# Patient Record
Sex: FEMALE | Race: WHITE | Employment: FULL TIME | URBAN - METROPOLITAN AREA
[De-identification: names, ages, dates, MRNs, and addresses within clinical notes are randomized per-mention and may not be internally consistent; named-entity substitution may affect disease eponyms.]

---

## 2017-01-28 DIAGNOSIS — N94.6 SEVERE MENSTRUAL CRAMPS: ICD-10-CM

## 2017-01-30 ENCOUNTER — OFFICE VISIT (OUTPATIENT)
Dept: INTERNAL MEDICINE CLINIC | Age: 42
End: 2017-01-30

## 2017-01-30 VITALS
HEIGHT: 62 IN | RESPIRATION RATE: 16 BRPM | TEMPERATURE: 98.3 F | DIASTOLIC BLOOD PRESSURE: 80 MMHG | WEIGHT: 148.6 LBS | BODY MASS INDEX: 27.34 KG/M2 | SYSTOLIC BLOOD PRESSURE: 100 MMHG | OXYGEN SATURATION: 98 % | HEART RATE: 95 BPM

## 2017-01-30 DIAGNOSIS — J01.10 ACUTE NON-RECURRENT FRONTAL SINUSITIS: Primary | ICD-10-CM

## 2017-01-30 RX ORDER — CYCLOBENZAPRINE HCL 5 MG
TABLET ORAL
Qty: 30 TAB | Refills: 2 | Status: SHIPPED | OUTPATIENT
Start: 2017-01-30 | End: 2017-10-01 | Stop reason: SDUPTHER

## 2017-01-30 RX ORDER — CODEINE PHOSPHATE AND GUAIFENESIN 10; 100 MG/5ML; MG/5ML
10 SOLUTION ORAL
Qty: 200 ML | Refills: 0 | Status: SHIPPED | OUTPATIENT
Start: 2017-01-30 | End: 2017-03-03 | Stop reason: ALTCHOICE

## 2017-01-30 RX ORDER — BUTALBITAL, ASPIRIN, CAFFEINE AND CODEINE PHOSPHATE 50; 325; 40; 30 MG/1; MG/1; MG/1; MG/1
CAPSULE ORAL
Qty: 30 CAP | Refills: 0 | OUTPATIENT
Start: 2017-01-30 | End: 2017-06-17 | Stop reason: SDUPTHER

## 2017-01-30 RX ORDER — AZITHROMYCIN 250 MG/1
250 TABLET, FILM COATED ORAL SEE ADMIN INSTRUCTIONS
Qty: 6 TAB | Refills: 0 | Status: SHIPPED | OUTPATIENT
Start: 2017-01-30 | End: 2017-02-04

## 2017-01-30 NOTE — MR AVS SNAPSHOT
Visit Information Date & Time Provider Department Dept. Phone Encounter #  
 1/30/2017  4:50 PM Erica Oshea, [address] [facility] Internal Medicine [phone] [id number] Follow-up Instructions Return if symptoms worsen or fail to improve. Upcoming Health Maintenance Date Due  
 PAP AKA CERVICAL CYTOLOGY 5/30/2017 COLONOSCOPY 8/31/2021 DTaP/Tdap/Td series (2 - Td) 3/21/2022 Allergies as of 1/30/2017  Review Complete On: 1/30/2017 By: Seng Urbina Severity Noted Reaction Type Reactions Amoxicillin  04/07/2016    Swelling Swelling of hands and feet and rash Bactrim [Sulfamethoxazole-trimethoprim]  02/15/2010    Swelling, Myalgia Doxycycline  07/15/2013    Other (comments)  
 insomnia Suprax [Cefixime]  02/15/2010    Swelling, Myalgia Current Immunizations  Reviewed on 6/28/2016 Name Date TDAP Vaccine 3/21/2012  4:06 PM  
  
 Not reviewed this visit You Were Diagnosed With   
  
 Codes Comments Acute non-recurrent frontal sinusitis    -  Primary ICD-10-CM: J01.10 ICD-9-CM: 122.2 Vitals BP Pulse Temp Resp Height(growth percentile) Weight(growth percentile) 100/80 (BP 1 Location: Right arm, BP Patient Position: Sitting) 95 98.3 °F (36.8 °C) (Oral) 16 5' 2\" (1.575 m) 148 lb 9.6 oz (67.4 kg) LMP SpO2 BMI OB Status Smoking Status 01/22/2017 (Exact Date) 98% 27.18 kg/m2 Having regular periods Never Smoker Vitals History BMI and BSA Data Body Mass Index Body Surface Area  
 27.18 kg/m 2 1.72 m 2 Preferred Pharmacy Pharmacy Name Phone Akilah Chacon [address] [place] [address] Jeu De Paume, [place] [facility] [phone] Your Updated Medication List  
  
   
This list is accurate as of: 1/30/17  5:56 PM.  Always use your most recent med list.  
  
  
  
  
 acetaminophen-codeine 300-30 mg per tablet Commonly known as:  TYLENOL #3  
 TAKE ONE TO TWO TABLETS BY MOUTH EVERY 6 HOURS AS NEEDED FOR SEVERE CRAMPS  
  
 aspirin delayed-release 81 mg tablet Take  by mouth daily. azithromycin 250 mg tablet Commonly known as:  Celina Arcekers Take 1 Tab by mouth See Admin Instructions for 5 days. BUTALBITAL COMPOUND W/CODEINE 45--40 mg per capsule Generic drug:  codeine-butalbital-aspirin-caffeine TAKE ONE CAPSULE BY MOUTH EVERY 6 HOURS AS NEEDED FOR PAIN  
  
 BYSTOLIC 5 mg tablet Generic drug:  nebivolol Take 2.5 mg by mouth daily. clonazePAM 1 mg tablet Commonly known as:  Mae Viking Take 1 mg by mouth three (3) times daily. cyclobenzaprine 5 mg tablet Commonly known as:  FLEXERIL  
TAKE ONE TABLET BY MOUTH THREE TIMES A DAY AS NEEDED FOR MUSCLE SPASM(S) (MENSTRUAL CRAMPS) EFFEXOR  mg capsule Generic drug:  venlafaxine-SR Take 75 mg by mouth every morning. Take 3 tabs daily FINACEA 15 % topical gel Generic drug:  azelaic acid Apply  to affected area two (2) times a day. guaiFENesin-codeine 100-10 mg/5 mL solution Commonly known as:  ROBITUSSIN AC Take 10 mL by mouth three (3) times daily as needed for Cough (night cough). Max Daily Amount: 30 mL.  
  
 hyoscyamine SL 0.125 mg SL tablet Commonly known as:  LEVSIN/SL Take 1 Tab by mouth every six (6) hours as needed for Cramping.  
  
 multivitamin tablet Commonly known as:  ONE A DAY Take 1 Tab by mouth daily. omeprazole 20 mg capsule Commonly known as:  PRILOSEC  
TAKE ONE CAPSULE BY MOUTH TWICE A DAY  
  
 ondansetron 4 mg disintegrating tablet Commonly known as:  ZOFRAN ODT  
DISSOLVE ONE TABLET BY MOUTH EVERY 8 HOURS AS NEEDED FOR NAUSEA  
  
 bciehzphw-uk-dcrfbaha-guaifen 5--200 mg Tab Commonly known as:  VICKS DAYQUIL SEVERE COLD-FLU As directed Prescriptions Printed  Refills  
 guaiFENesin-codeine (ROBITUSSIN AC) 100-10 mg/5 mL solution 0  
 Sig: Take 10 mL by mouth three (3) times daily as needed for Cough (night cough). Max Daily Amount: 30 mL. Class: Print Route: Oral  
  
Prescriptions Sent to Pharmacy Refills  
 azithromycin (ZITHROMAX) 250 mg tablet 0 Sig: Take 1 Tab by mouth See Admin Instructions for 5 days. Class: Normal  
 [facility]: [facility] [address] [place] [address] Jeu De Paume, [place] 2017 [facility] #: [phone] Route: Oral  
  
Follow-up Instructions Return if symptoms worsen or fail to improve. Patient Instructions Sinusitis: Care Instructions Your Care Instructions Sinusitis is an infection of the lining of the sinus cavities in your head. Sinusitis often follows a cold. It causes pain and pressure in your head and face. In most cases, sinusitis gets better on its own in 1 to 2 weeks. But some mild symptoms may last for several weeks. Sometimes antibiotics are needed. Follow-up care is a key part of your treatment and safety. Be sure to make and go to all appointments, and call your doctor if you are having problems. It's also a good idea to know your test results and keep a list of the medicines you take. How can you care for yourself at home? · Take an over-the-counter pain medicine, such as acetaminophen (Tylenol), ibuprofen (Advil, Motrin), or naproxen (Aleve). Read and follow all instructions on the label. · If the doctor prescribed antibiotics, take them as directed. Do not stop taking them just because you feel better. You need to take the full course of antibiotics. · Be careful when taking over-the-counter cold or flu medicines and Tylenol at the same time. Many of these medicines have acetaminophen, which is Tylenol. Read the labels to make sure that you are not taking more than the recommended dose. Too much acetaminophen (Tylenol) can be harmful.  
· Breathe warm, moist air from a steamy shower, a hot bath, or a sink filled with hot water. Avoid cold, dry air. Using a humidifier in your home may help. Follow the directions for cleaning the machine. · Use saline (saltwater) nasal washes to help keep your nasal passages open and wash out mucus and bacteria. You can buy saline nose drops at a grocery store or drugstore. Or you can make your own at home by adding 1 teaspoon of salt and 1 teaspoon of baking soda to 2 cups of distilled water. If you make your own, fill a bulb syringe with the solution, insert the tip into your nostril, and squeeze gently. Stan Mariah your nose. · Put a hot, wet towel or a warm gel pack on your face 3 or 4 times a day for 5 to 10 minutes each time. · Try a decongestant nasal spray like oxymetazoline (Afrin). Do not use it for more than 3 days in a row. Using it for more than 3 days can make your congestion worse. When should you call for help? Call your doctor now or seek immediate medical care if: 
· You have new or worse swelling or redness in your face or around your eyes. · You have a new or higher fever. Watch closely for changes in your health, and be sure to contact your doctor if: 
· You have new or worse facial pain. · The mucus from your nose becomes thicker (like pus) or has new blood in it. · You are not getting better as expected. Where can you learn more? Go to http://thais-ada.info/. Enter J481 in the search box to learn more about \"Sinusitis: Care Instructions. \" Current as of: July 29, 2016 Content Version: 11.1 © 0708-6509 Org[employer]. Care instructions adapted under license by [employer] (which disclaims liability or warranty for this information). If you have questions about a medical condition or this instruction, always ask your healthcare professional. [address] any warranty or liability for your use of this information. Introducing [facility] & HEALTH SERVICES! Dear French Listen: Thank you for requesting a Tolerx account. Our records indicate that you already have an active Tolerx account. You can access your account anytime at [email or web address] [email or web address]/Fwd: Power Did you know that you can access your hospital and ER discharge instructions at any time in Tolerx? You can also review all of your test results from your hospital stay or ER visit. Additional Information If you have questions, please visit the Frequently Asked Questions section of the Tolerx website at [email or web address] [email or web address]/Fwd: [email or web address]/. Remember, Tolerx is NOT to be used for urgent needs. For medical emergencies, dial 911. Now available from your iPhone and Android! Please provide this summary of care documentation to your next provider. Your primary care clinician is listed as SHAY BUI. If you have any questions after today's visit, please call [phone].

## 2017-01-30 NOTE — TELEPHONE ENCOUNTER
Called [facility] pharmacy and gave pharmacist verbal order for refill on butalbital compound w/codeine 40--40mg per capsule- take 1 cap by mouth every 6 hrs as needed for pain. Disp #30 with 0 additional refills.

## 2017-01-30 NOTE — TELEPHONE ENCOUNTER
Cyclobenzaprine last refilled on 6/28/16 and butalbital last refilled on  2/09/16. Are these ok to refill?

## 2017-01-30 NOTE — PATIENT INSTRUCTIONS
Sinusitis: Care Instructions  Your Care Instructions    Sinusitis is an infection of the lining of the sinus cavities in your head. Sinusitis often follows a cold. It causes pain and pressure in your head and face. In most cases, sinusitis gets better on its own in 1 to 2 weeks. But some mild symptoms may last for several weeks. Sometimes antibiotics are needed. Follow-up care is a key part of your treatment and safety. Be sure to make and go to all appointments, and call your doctor if you are having problems. It's also a good idea to know your test results and keep a list of the medicines you take. How can you care for yourself at home? · Take an over-the-counter pain medicine, such as acetaminophen (Tylenol), ibuprofen (Advil, Motrin), or naproxen (Aleve). Read and follow all instructions on the label. · If the doctor prescribed antibiotics, take them as directed. Do not stop taking them just because you feel better. You need to take the full course of antibiotics. · Be careful when taking over-the-counter cold or flu medicines and Tylenol at the same time. Many of these medicines have acetaminophen, which is Tylenol. Read the labels to make sure that you are not taking more than the recommended dose. Too much acetaminophen (Tylenol) can be harmful. · Breathe warm, moist air from a steamy shower, a hot bath, or a sink filled with hot water. Avoid cold, dry air. Using a humidifier in your home may help. Follow the directions for cleaning the machine. · Use saline (saltwater) nasal washes to help keep your nasal passages open and wash out mucus and bacteria. You can buy saline nose drops at a grocery store or drugstore. Or you can make your own at home by adding 1 teaspoon of salt and 1 teaspoon of baking soda to 2 cups of distilled water. If you make your own, fill a bulb syringe with the solution, insert the tip into your nostril, and squeeze gently. Lennox Maizes your nose.   · Put a hot, wet towel or a warm gel pack on your face 3 or 4 times a day for 5 to 10 minutes each time. · Try a decongestant nasal spray like oxymetazoline (Afrin). Do not use it for more than 3 days in a row. Using it for more than 3 days can make your congestion worse. When should you call for help? Call your doctor now or seek immediate medical care if:  · You have new or worse swelling or redness in your face or around your eyes. · You have a new or higher fever. Watch closely for changes in your health, and be sure to contact your doctor if:  · You have new or worse facial pain. · The mucus from your nose becomes thicker (like pus) or has new blood in it. · You are not getting better as expected. Where can you learn more? Go to http://thais-ada.info/. Enter X913 in the search box to learn more about \"Sinusitis: Care Instructions. \"  Current as of: July 29, 2016  Content Version: 11.1  © 4078-0006 [employer]. Care instructions adapted under license by [employer] (which disclaims liability or warranty for this information). If you have questions about a medical condition or this instruction, always ask your healthcare professional. [place] [address] any warranty or liability for your use of this information.

## 2017-01-31 NOTE — PROGRESS NOTES
HISTORY OF PRESENT ILLNESS  Dalia Sexton is a 46 y.o. female. Cold Symptoms   The history is provided by the patient. This is a new problem. Episode onset: 7 d. The problem has not changed since onset. The cough is productive of purulent sputum. There has been no fever. Associated symptoms include headaches and rhinorrhea. Pertinent negatives include no sore throat, no shortness of breath, no wheezing, no nausea and no vomiting. Treatments tried: sinus flush, dayquil at night, mucinex and tylenol. The treatment provided mild relief. She is not a smoker. Her past medical history does not include COPD or asthma. Sinus Pain    The history is provided by the patient. This is a new problem. The current episode started more than 2 days ago. The pain is moderate. Associated symptoms include congestion, hoarse voice, rhinorrhea and headaches. Pertinent negatives include no sore throat and no shortness of breath. Review of Systems   HENT: Positive for congestion, hoarse voice and rhinorrhea. Negative for sore throat. Respiratory: Negative for shortness of breath and wheezing. Gastrointestinal: Negative for nausea and vomiting. Neurological: Positive for headaches. Physical Exam   Constitutional: No distress. HENT:   Right Ear: Tympanic membrane and ear canal normal.   Left Ear: Tympanic membrane and ear canal normal.   Nose: Right sinus exhibits maxillary sinus tenderness and frontal sinus tenderness. Left sinus exhibits no maxillary sinus tenderness and no frontal sinus tenderness. Mouth/Throat: Posterior oropharyngeal edema and posterior oropharyngeal erythema present. No oropharyngeal exudate. Eyes: Conjunctivae are normal. Right eye exhibits no discharge. Left eye exhibits no discharge. Neck: Neck supple. Cardiovascular: Normal rate and regular rhythm. Exam reveals no gallop and no friction rub. No murmur heard.   Pulmonary/Chest: Effort normal and breath sounds normal. Lymphadenopathy:     She has no cervical adenopathy. Nursing note and vitals reviewed. ASSESSMENT and PLAN  Denise Egan was seen today for cold symptoms and sinus pain. Diagnoses and all orders for this visit:    Acute non-recurrent frontal sinusitis  -     guaiFENesin-codeine (ROBITUSSIN AC) 100-10 mg/5 mL solution; Take 10 mL by mouth three (3) times daily as needed for Cough (night cough). Max Daily Amount: 30 mL.  -     azithromycin (ZITHROMAX) 250 mg tablet; Take 1 Tab by mouth See Admin Instructions for 5 days. -     kbswndwoe-og-yantuqob-guaifen (VICKS DAYQUIL SEVERE COLD-FLU) 5--200 mg tab; As directed    This has been fully explained to the patient, who indicates understanding.

## 2017-02-18 RX ORDER — OMEPRAZOLE 20 MG/1
CAPSULE, DELAYED RELEASE ORAL
Qty: 60 CAP | Refills: 9 | Status: SHIPPED | OUTPATIENT
Start: 2017-02-18 | End: 2018-03-13 | Stop reason: SDUPTHER

## 2017-03-03 DIAGNOSIS — N94.6 SEVERE MENSTRUAL CRAMPS: ICD-10-CM

## 2017-03-03 RX ORDER — ACETAMINOPHEN AND CODEINE PHOSPHATE 300; 30 MG/1; MG/1
TABLET ORAL
Qty: 30 TAB | Refills: 3 | OUTPATIENT
Start: 2017-03-03 | End: 2017-09-08 | Stop reason: SDUPTHER

## 2017-03-03 NOTE — TELEPHONE ENCOUNTER
Phoned in prescription below to patients pharmacy on file - verbal order received : Dr Nickolas Tran.

## 2017-03-09 RX ORDER — NEBIVOLOL 5 MG/1
TABLET ORAL
Qty: 45 TAB | Refills: 11 | Status: SHIPPED | OUTPATIENT
Start: 2017-03-09 | End: 2018-03-11 | Stop reason: SDUPTHER

## 2017-05-25 ENCOUNTER — OFFICE VISIT (OUTPATIENT)
Dept: INTERNAL MEDICINE CLINIC | Age: 42
End: 2017-05-25

## 2017-05-25 VITALS
TEMPERATURE: 98.7 F | HEART RATE: 87 BPM | RESPIRATION RATE: 18 BRPM | HEIGHT: 62 IN | DIASTOLIC BLOOD PRESSURE: 80 MMHG | OXYGEN SATURATION: 95 % | WEIGHT: 148.8 LBS | BODY MASS INDEX: 27.38 KG/M2 | SYSTOLIC BLOOD PRESSURE: 116 MMHG

## 2017-05-25 DIAGNOSIS — J02.9 SORE THROAT: Primary | ICD-10-CM

## 2017-05-25 DIAGNOSIS — J06.9 VIRAL URI: ICD-10-CM

## 2017-05-25 LAB
S PYO AG THROAT QL: NEGATIVE
VALID INTERNAL CONTROL?: YES

## 2017-05-25 NOTE — MR AVS SNAPSHOT
Visit Information Date & Time Provider Department Dept. Phone Encounter #  
 5/25/2017 11:30 AM Jarrod Aguilar, 12[address] [facility] Internal Medicine [phone] [id number] Follow-up Instructions Return if symptoms worsen or fail to improve. Upcoming Health Maintenance Date Due  
 PAP AKA CERVICAL CYTOLOGY 5/30/2017 INFLUENZA AGE 9 TO ADULT 8/1/2017 COLONOSCOPY 8/31/2021 DTaP/Tdap/Td series (2 - Td) 3/21/2022 Allergies as of 5/25/2017  Review Complete On: 5/25/2017 By: Jarrod Aguilar MD  
  
 Severity Noted Reaction Type Reactions Amoxicillin  04/07/2016    Swelling Swelling of hands and feet and rash Bactrim [Sulfamethoxazole-trimethoprim]  02/15/2010    Swelling, Myalgia Doxycycline  07/15/2013    Other (comments)  
 insomnia Suprax [Cefixime]  02/15/2010    Swelling, Myalgia Current Immunizations  Reviewed on 6/28/2016 Name Date TDAP Vaccine 3/21/2012  4:06 PM  
  
 Not reviewed this visit You Were Diagnosed With   
  
 Codes Comments Sore throat    -  Primary ICD-10-CM: J02.9 ICD-9-CM: 876 Viral URI     ICD-10-CM: J06.9, B97.89 ICD-9-CM: 465.9 Vitals BP Pulse Temp Resp Height(growth percentile) Weight(growth percentile) 116/80 (BP 1 Location: Right arm, BP Patient Position: Sitting) 87 98.7 °F (37.1 °C) (Oral) 18 5' 2\" (1.575 m) 148 lb 12.8 oz (67.5 kg) LMP SpO2 BMI OB Status Smoking Status 05/09/2017 95% 27.22 kg/m2 Having regular periods Never Smoker Vitals History BMI and BSA Data Body Mass Index Body Surface Area  
 27.22 kg/m 2 1.72 m 2 Preferred Pharmacy Pharmacy Name Phone [facility] [address] [place] [facility] [phone] Your Updated Medication List  
  
   
This list is accurate as of: 5/25/17 12:40 PM.  Always use your most recent med list.  
  
  
  
  
 acetaminophen-codeine 300-30 mg per tablet Commonly known as:  TYLENOL #3  
TAKE ONE TO TWO TABLETS BY MOUTH EVERY 6 HOURS AS NEEDED FOR SEVERE CRAMPS  
  
 aspirin delayed-release 81 mg tablet Take  by mouth daily. BUTALBITAL COMPOUND W/CODEINE 52--40 mg per capsule Generic drug:  codeine-butalbital-aspirin-caffeine TAKE ONE CAPSULE BY MOUTH EVERY 6 HOURS AS NEEDED FOR PAIN  
  
 clonazePAM 1 mg tablet Commonly known as:  Hunter Fabry Take 1 mg by mouth three (3) times daily. cyclobenzaprine 5 mg tablet Commonly known as:  FLEXERIL  
TAKE ONE TABLET BY MOUTH THREE TIMES A DAY AS NEEDED FOR MUSCLE SPASM(S) (MENSTRUAL CRAMPS) EFFEXOR  mg capsule Generic drug:  venlafaxine-SR Take 75 mg by mouth every morning. Take 3 tabs daily FINACEA 15 % topical gel Generic drug:  azelaic acid Apply  to affected area two (2) times a day. hyoscyamine SL 0.125 mg SL tablet Commonly known as:  LEVSIN/SL Take 1 Tab by mouth every six (6) hours as needed for Cramping. MUCINEX PO Take  by mouth. nebivolol 5 mg tablet Commonly known as:  BYSTOLIC Take 1/2 tablet daily. omeprazole 20 mg capsule Commonly known as:  PRILOSEC  
TAKE ONE CAPSULE BY MOUTH TWICE A DAY  
  
 ondansetron 4 mg disintegrating tablet Commonly known as:  ZOFRAN ODT  
DISSOLVE ONE TABLET BY MOUTH EVERY 8 HOURS AS NEEDED FOR NAUSEA  
  
 mfvlrirsq-kd-jylnmuhv-guaifen 5--200 mg Tab Commonly known as:  VICKS DAYQUIL SEVERE COLD-FLU As directed TYLENOL PO Take  by mouth. We Performed the Following AMB POC RAPID STREP A [75743 CPT(R)] CULTURE, STREP THROAT [id number] CPT(R)] Follow-up Instructions Return if symptoms worsen or fail to improve. Introducing hospitals & HEALTH SERVICES! Dear Erlinda Adamson: Thank you for requesting a Research Journalist account. Our records indicate that you already have an active Research Journalist account. You can access your account anytime at [email or web address] Did you know that you can access your hospital and ER discharge instructions at any time in Kizoom? You can also review all of your test results from your hospital stay or ER visit. Additional Information If you have questions, please visit the Frequently Asked Questions section of the Kizoom website at [email or web address] Remember, Kizoom is NOT to be used for urgent needs. For medical emergencies, dial 911. Now available from your iPhone and Android! Please provide this summary of care documentation to your next provider. Your primary care clinician is listed as SHAY BUI. If you have any questions after today's visit, please call [phone].

## 2017-05-25 NOTE — PROGRESS NOTES
HISTORY OF PRESENT ILLNESS  Ange Dewitt is a 46 y.o. female. URI    The history is provided by the patient. This is a new problem. Episode onset: 4 d. The problem has been gradually improving. There has been a fever of 101 - 101.9 F. The fever has been present for 1 - 2 days. Associated symptoms include congestion and sore throat. Pertinent negatives include no diarrhea, no nausea, no vomiting and no cough. Treatments tried: tylenol, mucinex. The treatment provided mild relief. Review of Systems   HENT: Positive for congestion and sore throat. Respiratory: Negative for cough. Gastrointestinal: Negative for diarrhea, nausea and vomiting. Musculoskeletal: Positive for myalgias. Physical Exam   Constitutional: No distress. HENT:   Right Ear: Tympanic membrane and ear canal normal.   Left Ear: Tympanic membrane and ear canal normal.   Nose: Right sinus exhibits frontal sinus tenderness. Right sinus exhibits no maxillary sinus tenderness. Left sinus exhibits no maxillary sinus tenderness and no frontal sinus tenderness. Mouth/Throat: Posterior oropharyngeal edema and posterior oropharyngeal erythema present. No oropharyngeal exudate. Eyes: Conjunctivae are normal. Right eye exhibits no discharge. Left eye exhibits no discharge. Neck: Neck supple. Cardiovascular: Normal rate and regular rhythm. Exam reveals no gallop and no friction rub. No murmur heard. Pulmonary/Chest: Effort normal and breath sounds normal.   Lymphadenopathy:     She has no cervical adenopathy. Nursing note and vitals reviewed. ASSESSMENT and PLAN  Carla Aguero was seen today for sore throat and nasal congestion. Diagnoses and all orders for this visit:    Sore throat  -     CULTURE, STREP THROAT  -     AMB POC RAPID STREP A- neg  -     mlilkqluj-bl-zhmozwbx-guaifen (VICKS DAYQUIL SEVERE COLD-FLU) 5--200 mg tab;  As directed    Viral URI  -     awxnvhume-ql-yhiitiuf-guaifen (VICKS DAYQUIL SEVERE COLD-FLU) 8--200 mg tab;  As directed

## 2017-05-25 NOTE — LETTER
NOTIFICATION RETURN TO WORK / SCHOOL 
 
5/25/2017 12:31 PM 
 
Ms. Veronica Couch [address] [place] [address] [phone] To Whom It May Concern: 
 
Veronica Couch is currently under the care of Anila Palumbo. She will return to work/school on: 5 26 17 If there are questions or concerns please have the patient contact our office. Sincerely, Jarrod Aguilar MD

## 2017-05-27 LAB — B-HEM STREP SPEC QL CULT: NEGATIVE

## 2017-06-19 NOTE — TELEPHONE ENCOUNTER
Last refill was on 1/30/17 with no additional refills. Last ov on 5/25/17, no future ov scheduled at this time. Called pt and informed MD is out of the office till tomorrow she states she has enough medication to last till he returns.  Will fwd msg to MD.

## 2017-06-20 RX ORDER — BUTALBITAL, ASPIRIN, CAFFEINE AND CODEINE PHOSPHATE 50; 325; 40; 30 MG/1; MG/1; MG/1; MG/1
CAPSULE ORAL
Qty: 30 CAP | Refills: 0 | OUTPATIENT
Start: 2017-06-20 | End: 2017-09-18 | Stop reason: SDUPTHER

## 2017-07-25 ENCOUNTER — HOSPITAL ENCOUNTER (EMERGENCY)
Age: 42
Discharge: HOME | End: 2017-07-25
Attending: STUDENT IN AN ORGANIZED HEALTH CARE EDUCATION/TRAINING PROGRAM | Admitting: STUDENT IN AN ORGANIZED HEALTH CARE EDUCATION/TRAINING PROGRAM
Payer: COMMERCIAL

## 2017-07-25 ENCOUNTER — APPOINTMENT (OUTPATIENT)
Dept: ULTRASOUND IMAGING | Age: 42
End: 2017-07-25
Attending: STUDENT IN AN ORGANIZED HEALTH CARE EDUCATION/TRAINING PROGRAM
Payer: COMMERCIAL

## 2017-07-25 VITALS
DIASTOLIC BLOOD PRESSURE: 70 MMHG | BODY MASS INDEX: 27.46 KG/M2 | HEIGHT: 63 IN | TEMPERATURE: 98.1 F | RESPIRATION RATE: 16 BRPM | WEIGHT: 154.98 LBS | HEART RATE: 69 BPM | SYSTOLIC BLOOD PRESSURE: 118 MMHG | OXYGEN SATURATION: 98 %

## 2017-07-25 DIAGNOSIS — M79.662 PAIN OF LEFT CALF: Primary | ICD-10-CM

## 2017-07-25 PROCEDURE — 93971 EXTREMITY STUDY: CPT

## 2017-07-25 PROCEDURE — 99282 EMERGENCY DEPT VISIT SF MDM: CPT

## 2017-07-25 NOTE — ED NOTES
Plan of care and all test ordered explained to pt. Good understanding and agreement with plan was verbalized.

## 2017-07-25 NOTE — ED NOTES
Purposeful rounding done. Pt sitting up on stretcher. Offered assist with any needs. Call bell in reach will continue to monitor.

## 2017-07-25 NOTE — DISCHARGE INSTRUCTIONS
We hope that we have addressed all of your medical concerns. The examination and treatment you received in the Emergency Department were for an emergent problem and were not intended as complete care. It is important that you follow up with your healthcare provider(s) for ongoing care. If your symptoms worsen or do not improve as expected, and you are unable to reach your usual health care provider(s), you should return to the Emergency Department. Today's healthcare is undergoing tremendous change, and patient satisfaction surveys are one of the many tools to assess the quality of medical care. You may receive a survey from the [employer] organization regarding your experience in the Emergency Department. I hope that your experience has been completely positive, particularly the medical care that I provided. As such, please participate in the survey; anything less than excellent does not meet my expectations or intentions. [facility] and 8 [facility] participate in nationally recognized quality of care measures. If your blood pressure is greater than 120/80, as reported below, we urge that you seek medical care to address the potential of high blood pressure, commonly known as hypertension. Hypertension can be hereditary or can be caused by certain medical conditions, pain, stress, or \"white coat syndrome. \"       Please make an appointment with your health care provider(s) for follow up of your Emergency Department visit. VITALS:   Patient Vitals for the past 8 hrs:   Temp Pulse Resp BP SpO2   07/25/17 0700 98.1 °F (36.7 °C) 83 18 136/88 97 %          Thank you for allowing us to provide you with medical care today. We realize that you have many choices for your emergency care needs. Please choose us in the future for any continued health care needs. Aileen Perez, [address] [place] [address].   Office: [phone]            No results found for this or any previous visit (from the past 24 hour(s)). Duplex Lower Ext Venous Left    Result Date: 7/25/2017  INDICATION:  concern for DVT left lower ext. Pain left leg COMPARISON: None. FINDINGS: Duplex Doppler sonography of the left lower extremity was performed from the groin to the calf. The left common femoral, femoral and popliteal veins are compressible with normal color-flow and wave forms and response to physiologic maneuvers including Valsalva and augmentation. IMPRESSION:  No deep venous thrombosis identified. Shin Splints (Shin Pain): Exercises  Your Care Instructions  Here are some examples of typical rehabilitation exercises for your condition. Start each exercise slowly. Ease off the exercise if you start to have pain. Your doctor or physical therapist will tell you when you can start these exercises and which ones will work best for you. How to do the exercises  Calf wall stretch (back knee straight)    1. Stand facing a wall with your hands on the wall at about eye level. Put your affected leg about a step behind your other leg. 2. Keeping your back leg straight and your back heel on the floor, bend your front knee and gently bring your hip and chest toward the wall until you feel a stretch in the calf of your back leg. 3. Hold the stretch for at least 15 to 30 seconds. 4. Repeat 2 to 4 times. Calf wall stretch (knees bent)    1. Stand facing a wall with your hands on the wall at about eye level. Put your affected leg about a step behind your other leg. 2. Keeping both heels on the floor, bend both knees. Then gently bring your hip and chest toward the wall until you feel a stretch in the calf of your back leg. 3. Hold the stretch for at least 15 to 30 seconds. 4. Repeat 2 to 4 times. Hamstring wall stretch    1. Lie on your back in a doorway, with your good leg through the open door.   2. Slide your affected leg up the wall to straighten your knee. You should feel a gentle stretch down the back of your leg. ¨ Do not arch your back. ¨ Do not bend either knee. ¨ Keep one heel touching the floor and the other heel touching the wall. Do not point your toes. 3. Hold the stretch for at least 1 minute to begin. Then over time, try to lengthen the time you hold the stretch to as long as 6 minutes. 4. Repeat 2 to 4 times. If you do not have a place to do this exercise in a doorway, there is another way to do it:  1. Lie on your back, and bend the knee of your affected leg. 2. Loop a towel under the ball and toes of that foot, and hold the ends of the towel in your hands. 3. Straighten your knee, and slowly pull back on the towel. You should feel a gentle stretch down the back of your leg. 4. Hold the stretch for 15 to 30 seconds. Or even better, hold the stretch for 1 minute if you can. 5. Repeat 2 to 4 times. Shin muscle stretch    1. Sit in a chair, with both feet flat on the floor. 2. Bend your affected leg behind you so that the top of your foot near your toes is flat on the floor and your toes are pointed away from your body. If you need to, you can hold on to the sides of the chair for support. 3. Hold the stretch for at least 15 to 30 seconds. You should feel a stretch in the front (shin) of your lower leg. 4. Repeat 2 to 4 times. Follow-up care is a key part of your treatment and safety. Be sure to make and go to all appointments, and call your doctor if you are having problems. It's also a good idea to know your test results and keep a list of the medicines you take. Where can you learn more? Go to http://thais-ada.info/. Enter U446 in the search box to learn more about \"Shin Splints (Shin Pain): Exercises. \"  Current as of: March 21, 2017  Content Version: 11.3  © 1921-9857 [employer].  Care instructions adapted under license by [employer] (which disclaims liability or warranty for this information). If you have questions about a medical condition or this instruction, always ask your healthcare professional. [place] [address] any warranty or liability for your use of this information.

## 2017-07-25 NOTE — ED PROVIDER NOTES
HPI Comments: Ms. Edgar Robertson is a 68-year-old female presenting to the emergency department with left calf pain. Patient states that the pain started approximately one week ago and progressively become worse patient is concerned for a DVT as she has a past medical history significant for factor V Leiden deficiency. Patient denies any other associated symptoms including shortness of breath chest pain dyspnea on exertion. Patient says that she has recently started working out again including cardiac activity running in place and is coming in to the ED for ultrasound for reassurance. Patient is a 38 y.o. female presenting with leg pain. The history is provided by the patient. Leg Pain    Pertinent negatives include no numbness and no back pain. Past Medical History:   Diagnosis Date    Dysmenorrhea     Factor V Leiden mutation ([address]     Headache     migraines    Hypercholesterolemia     Panic attacks     PVC's        Past Surgical History:   Procedure Laterality Date    HX HEENT      lasik    HX UROLOGICAL      dilation age 7         Family History:   Problem Relation Age of Onset    Other Sister      protein C defic., DVT    Cancer Maternal Grandmother      breast    Cancer Paternal Grandmother      ? Social History     Social History    Marital status: SINGLE     Spouse name: N/A    Number of children: N/A    Years of education: N/A     Occupational History    Not on file. Social History Main Topics    Smoking status: Never Smoker    Smokeless tobacco: Never Used    Alcohol use Yes      Comment: rarely    Drug use: Not on file    Sexual activity: Not on file     Other Topics Concern    Not on file     Social History Narrative         ALLERGIES: Amoxicillin; Bactrim [sulfamethoxazole-trimethoprim]; Doxycycline; and Suprax [cefixime]    Review of Systems   Constitutional: Negative for activity change, diaphoresis, fatigue and fever. HENT: Negative for congestion and sore throat. Eyes: Negative for photophobia and visual disturbance. Respiratory: Negative for chest tightness and shortness of breath. Cardiovascular: Negative for chest pain, palpitations and leg swelling. Gastrointestinal: Negative for abdominal pain, blood in stool, constipation, diarrhea, nausea and vomiting. Genitourinary: Negative for difficulty urinating, dysuria, flank pain, frequency and hematuria. Musculoskeletal: Negative for back pain. Neurological: Negative for dizziness, syncope, numbness and headaches. Vitals:    07/25/17 0700 07/25/17 0820   BP: 136/88 118/70   Pulse: 83 69   Resp: 18 16   Temp: 98.1 °F (36.7 °C)    SpO2: 97% 98%   Weight: 70.3 kg (154 lb 15.7 oz)    Height: 5' 3\" (1.6 m)             Physical Exam   Constitutional: She is oriented to person, place, and time. She appears well-developed and well-nourished. No distress. HENT:   Head: Normocephalic and atraumatic. Nose: Nose normal.   Mouth/Throat: Oropharynx is clear and moist. No oropharyngeal exudate. Eyes: Conjunctivae and EOM are normal. Right eye exhibits no discharge. Left eye exhibits no discharge. No scleral icterus. Neck: Normal range of motion. Neck supple. No JVD present. No tracheal deviation present. No thyromegaly present. Cardiovascular: Normal rate, regular rhythm, normal heart sounds and intact distal pulses. Exam reveals no gallop and no friction rub. No murmur heard. Pulmonary/Chest: Effort normal and breath sounds normal. No stridor. No respiratory distress. She has no wheezes. She has no rales. She exhibits no tenderness. Abdominal: Bowel sounds are normal. She exhibits no distension and no mass. There is no tenderness. There is no rebound. Musculoskeletal: Normal range of motion. She exhibits no edema or tenderness. Lymphadenopathy:     She has no cervical adenopathy. Neurological: She is alert and oriented to person, place, and time. No cranial nerve deficit.  Coordination normal. Skin: Skin is warm and dry. No rash noted. She is not diaphoretic. No erythema. No pallor. Psychiatric: She has a normal mood and affect. Her behavior is normal. Judgment and thought content normal.        MDM  Number of Diagnoses or Management Options  Pain of left calf:      Amount and/or Complexity of Data Reviewed  Tests in the radiology section of CPT®: ordered and reviewed  Review and summarize past medical records: yes    Risk of Complications, Morbidity, and/or Mortality  Presenting problems: moderate  Diagnostic procedures: moderate  Management options: moderate    Patient Progress  Patient progress: stable    ED Course       Procedures      6:29 PM  The patient has been reevaluated. The patient is ready for discharge. The patient's signs, symptoms, diagnosis, and discharge instructions have been discussed and the patient/ family has conveyed their understanding. The patient is to follow up as recommended or return to the ED should their symptoms worsen. Plan has been discussed and the patient is in agreement. LABORATORY TESTS:  No results found for this or any previous visit (from the past 12 hour(s)). IMAGING RESULTS:  DUPLEX LOWER EXT VENOUS LEFT   Final Result        Duplex Lower Ext Venous Left    Result Date: 7/25/2017  INDICATION:  concern for DVT left lower ext. Pain left leg COMPARISON: None. FINDINGS: Duplex Doppler sonography of the left lower extremity was performed from the groin to the calf. The left common femoral, femoral and popliteal veins are compressible with normal color-flow and wave forms and response to physiologic maneuvers including Valsalva and augmentation. IMPRESSION:  No deep venous thrombosis identified. MEDICATIONS GIVEN:  Medications - No data to display    IMPRESSION:  1. Pain of left calf        PLAN:  1. Discharge Medication List as of 7/25/2017  8:11 AM        2.    Follow-up Information     Follow up With Details Comments [facility] Jen Person MD  If symptoms worsen [address]   [address] [facility]  [phone]      [facility]  If symptoms worsen [place] [address]  [phone]            Return to ED for new or worsening symptoms       Belinda Gee MD

## 2017-07-25 NOTE — ED NOTES
Pt was discharged and given instructions by Dr Nigel Fuentes . Pt verbalized good understanding of all discharge instructions and F/U care. All questions answered. Pt in stable condition on discharge.

## 2017-08-31 ENCOUNTER — OFFICE VISIT (OUTPATIENT)
Dept: INTERNAL MEDICINE CLINIC | Age: 42
End: 2017-08-31

## 2017-08-31 VITALS
RESPIRATION RATE: 16 BRPM | BODY MASS INDEX: 27.5 KG/M2 | OXYGEN SATURATION: 94 % | WEIGHT: 155.2 LBS | HEART RATE: 90 BPM | DIASTOLIC BLOOD PRESSURE: 80 MMHG | TEMPERATURE: 99.2 F | SYSTOLIC BLOOD PRESSURE: 110 MMHG | HEIGHT: 63 IN

## 2017-08-31 DIAGNOSIS — L85.8 KERATOSIS PILARIS: Primary | ICD-10-CM

## 2017-08-31 NOTE — MR AVS SNAPSHOT
Visit Information Date & Time Provider Department Dept. Phone Encounter #  
 8/31/2017  4:50 PM Rachid Castellano, [address] [phone] Follow-up Instructions Return if symptoms worsen or fail to improve. Upcoming Health Maintenance Date Due INFLUENZA AGE 9 TO ADULT 8/1/2017 PAP AKA CERVICAL CYTOLOGY 6/1/2018 COLONOSCOPY 8/31/2021 DTaP/Tdap/Td series (2 - Td) 3/21/2022 Allergies as of 8/31/2017  Review Complete On: 8/31/2017 By: Rachid Castellano MD  
  
 Severity Noted Reaction Type Reactions Amoxicillin  04/07/2016    Swelling Swelling of hands and feet and rash Bactrim [Sulfamethoxazole-trimethoprim]  02/15/2010    Swelling, Myalgia Doxycycline  07/15/2013    Other (comments)  
 insomnia Suprax [Cefixime]  02/15/2010    Swelling, Myalgia Current Immunizations  Reviewed on 6/28/2016 Name Date TDAP Vaccine 3/21/2012  4:06 PM  
  
 Not reviewed this visit You Were Diagnosed With   
  
 Codes Comments Keratosis pilaris    -  Primary ICD-10-CM: L85.8 ICD-9-CM: 757.39 Vitals BP Pulse Temp Resp Height(growth percentile) Weight(growth percentile) 110/80 (BP 1 Location: Right arm, BP Patient Position: Sitting) 90 99.2 °F (37.3 °C) (Oral) 16 5' 3\" (1.6 m) 155 lb 3.2 oz (70.4 kg) LMP SpO2 BMI OB Status Smoking Status 08/27/2017 (Exact Date) 94% 27.49 kg/m2 Having regular periods Never Smoker BMI and BSA Data Body Mass Index Body Surface Area  
 27.49 kg/m 2 1.77 m 2 Preferred Pharmacy Pharmacy Name Phone [facility], [place] [address] [facility] [phone] Your Updated Medication List  
  
   
This list is accurate as of: 8/31/17  5:46 PM.  Always use your most recent med list.  
  
  
  
  
 acetaminophen-codeine 300-30 mg per tablet Commonly known as:  TYLENOL #3  
 TAKE ONE TO TWO TABLETS BY MOUTH EVERY 6 HOURS AS NEEDED FOR SEVERE CRAMPS  
  
 aspirin delayed-release 81 mg tablet Take  by mouth daily. BUTALBITAL COMPOUND W/CODEINE 22--40 mg per capsule Generic drug:  codeine-butalbital-aspirin-caffeine TAKE ONE CAPSULE BY MOUTH EVERY 6 HOURS AS NEEDED FOR PAIN  
  
 clonazePAM 1 mg tablet Commonly known as:  Waynard Huitron Take 1 mg by mouth three (3) times daily. cyclobenzaprine 5 mg tablet Commonly known as:  FLEXERIL  
TAKE ONE TABLET BY MOUTH THREE TIMES A DAY AS NEEDED FOR MUSCLE SPASM(S) (MENSTRUAL CRAMPS) EFFEXOR  mg capsule Generic drug:  venlafaxine-SR Take 75 mg by mouth every morning. Take 3 tabs daily FINACEA 15 % topical gel Generic drug:  azelaic acid Apply  to affected area two (2) times a day. hyoscyamine SL 0.125 mg SL tablet Commonly known as:  LEVSIN/SL  
DISSOLVE ONE TABLET UNDER THE TONGUE EVERY 6 HOURS AS NEEDED FOR CRAMPING  
  
 nebivolol 5 mg tablet Commonly known as:  BYSTOLIC Take 1/2 tablet daily. omeprazole 20 mg capsule Commonly known as:  PRILOSEC  
TAKE ONE CAPSULE BY MOUTH TWICE A DAY  
  
 ondansetron 4 mg disintegrating tablet Commonly known as:  ZOFRAN ODT  
DISSOLVE ONE TABLET BY MOUTH EVERY 8 HOURS AS NEEDED FOR NAUSEA We Performed the Following REFERRAL TO DERMATOLOGY [REF19 Custom] Follow-up Instructions Return if symptoms worsen or fail to improve. Referral Information Referral ID Referred By Referred To  
  
 [id number] Carolina BUI MD   
   [id number] [facility], [address] Phone: [phone] Fax: [phone] Visits Status Start Date End Date 1 New Request 8/31/17 8/31/18 If your referral has a status of pending review or denied, additional information will be sent to support the outcome of this decision. Patient Instructions Keratosis Pilaris in Children: Care Instructions Your Care Instructions Keratosis pilaris is a skin problem. It hardens the skin around pores or hair follicles. A hair follicle is the place where a hair begins to grow. Your child may have small, red bumps on his or her arms or thighs. You might notice them more in winter than summer. The bumps may come and go. Often, they go away as a child gets older. In some cases, this skin problem is passed down from family members. It is more common in children who have asthma, hay fever, eczema, or other skin problems. This problem is not an infection, and it is not contagious. Your child can't spread it to others. It also won't hurt your child and it usually doesn't itch. But if your child feels embarrassed, cleansers and lotions may help it look better. Follow-up care is a key part of your child's treatment and safety. Be sure to make and go to all appointments, and call your doctor if your child is having problems. It's also a good idea to know your child's test results and keep a list of the medicines your child takes. How can you care for your child at home? · Use a gentle soap or cleanser that won't dry your child's skin. Good choices are Aveeno, Dove, and Neutrogena. · Put a mild, over-the-counter lotion on your child's skin. Do this several times a day. · Try different cleansers, soaps, and lotions to find ones that work for your child. · If the ones you try don't work, ask your doctor for suggestions. Some doctors suggest lotions with lactic acid. Two examples are Lac-Hydrin or LactiCare. · If your child's doctor prescribes a cream, use it as directed. If the doctor prescribes medicine, give it exactly as directed. When should you call for help? Call your doctor now or seek immediate medical care if: 
· Your child has symptoms of infection, such as: 
¨ Increased pain, swelling, warmth, or redness. ¨ Red streaks leading from the area. ¨ Pus draining from the area. ¨ A fever. Watch closely for changes in your child's health, and be sure to contact your doctor if: 
· Your child does not get better as expected. Where can you learn more? Go to http://thais-ada.info/. Enter Q531 in the search box to learn more about \"Keratosis Pilaris in Children: Care Instructions. \" Current as of: October 13, 2016 Content Version: 11.3 © 5965-2508 [employer]. Care instructions adapted under license by [employer] (which disclaims liability or warranty for this information). If you have questions about a medical condition or this instruction, always ask your healthcare professional. [address] any warranty or liability for your use of this information. Introducing [facility] & HEALTH SERVICES! Dear Maritza Rosario: Thank you for requesting a [employer] account. Our records indicate that you already have an active [employer] account. You can access your account anytime at [email or web address] Did you know that you can access your hospital and ER discharge instructions at any time in [employer]? You can also review all of your test results from your hospital stay or ER visit. Additional Information If you have questions, please visit the Frequently Asked Questions section of the [employer] website at [email or web address] Remember, [employer] is NOT to be used for urgent needs. For medical emergencies, dial 911. Now available from your iPhone and Android! Please provide this summary of care documentation to your next provider. Your primary care clinician is listed as SHAY BUI. If you have any questions after today's visit, please call [phone].

## 2017-08-31 NOTE — PATIENT INSTRUCTIONS
Keratosis Pilaris in Children: Care Instructions  Your Care Instructions  Keratosis pilaris is a skin problem. It hardens the skin around pores or hair follicles. A hair follicle is the place where a hair begins to grow. Your child may have small, red bumps on his or her arms or thighs. You might notice them more in winter than summer. The bumps may come and go. Often, they go away as a child gets older. In some cases, this skin problem is passed down from family members. It is more common in children who have asthma, hay fever, eczema, or other skin problems. This problem is not an infection, and it is not contagious. Your child can't spread it to others. It also won't hurt your child and it usually doesn't itch. But if your child feels embarrassed, cleansers and lotions may help it look better. Follow-up care is a key part of your child's treatment and safety. Be sure to make and go to all appointments, and call your doctor if your child is having problems. It's also a good idea to know your child's test results and keep a list of the medicines your child takes. How can you care for your child at home? · Use a gentle soap or cleanser that won't dry your child's skin. Good choices are Aveeno, Dove, and Neutrogena. · Put a mild, over-the-counter lotion on your child's skin. Do this several times a day. · Try different cleansers, soaps, and lotions to find ones that work for your child. · If the ones you try don't work, ask your doctor for suggestions. Some doctors suggest lotions with lactic acid. Two examples are Lac-Hydrin or LactiCare. · If your child's doctor prescribes a cream, use it as directed. If the doctor prescribes medicine, give it exactly as directed. When should you call for help? Call your doctor now or seek immediate medical care if:  · Your child has symptoms of infection, such as:  ¨ Increased pain, swelling, warmth, or redness. ¨ Red streaks leading from the area.   ¨ Pus draining from the area. ¨ A fever. Watch closely for changes in your child's health, and be sure to contact your doctor if:  · Your child does not get better as expected. Where can you learn more? Go to http://thais-ada.info/. Enter F672 in the search box to learn more about \"Keratosis Pilaris in Children: Care Instructions. \"  Current as of: October 13, 2016  Content Version: 11.3  © 5542-5518 [employer]. Care instructions adapted under license by [employer] (which disclaims liability or warranty for this information). If you have questions about a medical condition or this instruction, always ask your healthcare professional. [address] any warranty or liability for your use of this information.

## 2017-09-08 DIAGNOSIS — N94.6 SEVERE MENSTRUAL CRAMPS: ICD-10-CM

## 2017-09-08 RX ORDER — ACETAMINOPHEN AND CODEINE PHOSPHATE 300; 30 MG/1; MG/1
TABLET ORAL
Qty: 30 TAB | Refills: 3 | OUTPATIENT
Start: 2017-09-08 | End: 2018-04-21 | Stop reason: SDUPTHER

## 2017-09-08 NOTE — TELEPHONE ENCOUNTER
Phoned in prescription below to patients pharmacy on file - verbal order received : Dr María Elena Snyder.

## 2017-09-11 ENCOUNTER — TELEPHONE (OUTPATIENT)
Dept: INTERNAL MEDICINE CLINIC | Age: 42
End: 2017-09-11

## 2017-09-11 NOTE — TELEPHONE ENCOUNTER
Spoke with Eliezer Garcia pharmacist at [facility] - advised her I called Rx in on 9/8/17. She states for some reason did not have quantity or refills - advised #30 with 3 refills.

## 2017-09-11 NOTE — TELEPHONE ENCOUNTER
[facility] [address], [place] [address] Mas[facility] Stiles (Pharmacy) [phone]     Calling to verify quanity of rx for tylenol 3 rx.

## 2017-09-15 RX ORDER — ONDANSETRON 4 MG/1
TABLET, ORALLY DISINTEGRATING ORAL
Qty: 30 TAB | Refills: 2 | Status: SHIPPED | OUTPATIENT
Start: 2017-09-15 | End: 2018-06-23 | Stop reason: SDUPTHER

## 2017-09-18 RX ORDER — BUTALBITAL, ASPIRIN, CAFFEINE AND CODEINE PHOSPHATE 50; 325; 40; 30 MG/1; MG/1; MG/1; MG/1
CAPSULE ORAL
Qty: 30 CAP | Refills: 0 | OUTPATIENT
Start: 2017-09-18 | End: 2018-01-21 | Stop reason: SDUPTHER

## 2017-09-30 DIAGNOSIS — N94.6 SEVERE MENSTRUAL CRAMPS: ICD-10-CM

## 2017-10-01 RX ORDER — CYCLOBENZAPRINE HCL 5 MG
TABLET ORAL
Qty: 30 TAB | Refills: 1 | Status: SHIPPED | COMMUNITY
Start: 2017-10-01 | End: 2018-01-01 | Stop reason: SDUPTHER

## 2018-01-01 DIAGNOSIS — N94.6 SEVERE MENSTRUAL CRAMPS: ICD-10-CM

## 2018-01-02 RX ORDER — CYCLOBENZAPRINE HCL 5 MG
TABLET ORAL
Qty: 30 TAB | Refills: 0 | Status: SHIPPED | OUTPATIENT
Start: 2018-01-02 | End: 2018-03-13 | Stop reason: SDUPTHER

## 2018-01-21 DIAGNOSIS — G43.709 CHRONIC MIGRAINE WITHOUT AURA WITHOUT STATUS MIGRAINOSUS, NOT INTRACTABLE: Primary | ICD-10-CM

## 2018-01-22 RX ORDER — BUTALBITAL, ASPIRIN, CAFFEINE AND CODEINE PHOSPHATE 50; 325; 40; 30 MG/1; MG/1; MG/1; MG/1
CAPSULE ORAL
Qty: 30 CAP | Refills: 0 | OUTPATIENT
Start: 2018-01-22 | End: 2018-06-04

## 2018-01-22 NOTE — TELEPHONE ENCOUNTER
[employer] and spoke to the pharmacist- Viridiana Romero and gave the verbal order for refill on Butalbital compound W/codeine 07--40 mg per capsule-take 1 capsule by mouth every 6 hrs as needed for pain. Dispense #30, refills-0.

## 2018-03-11 RX ORDER — NEBIVOLOL HYDROCHLORIDE 5 MG/1
TABLET ORAL
Qty: 45 TAB | Refills: 3 | Status: SHIPPED | OUTPATIENT
Start: 2018-03-11 | End: 2019-03-11 | Stop reason: SDUPTHER

## 2018-03-13 DIAGNOSIS — N94.6 SEVERE MENSTRUAL CRAMPS: ICD-10-CM

## 2018-03-14 RX ORDER — OMEPRAZOLE 20 MG/1
CAPSULE, DELAYED RELEASE ORAL
Qty: 60 CAP | Refills: 8 | Status: SHIPPED | OUTPATIENT
Start: 2018-03-14 | End: 2018-03-15 | Stop reason: SDUPTHER

## 2018-03-14 RX ORDER — CYCLOBENZAPRINE HCL 5 MG
TABLET ORAL
Qty: 30 TAB | Refills: 0 | Status: SHIPPED | OUTPATIENT
Start: 2018-03-14 | End: 2018-05-04 | Stop reason: SDUPTHER

## 2018-03-15 ENCOUNTER — OFFICE VISIT (OUTPATIENT)
Dept: INTERNAL MEDICINE CLINIC | Age: 43
End: 2018-03-15

## 2018-03-15 VITALS
WEIGHT: 150.8 LBS | TEMPERATURE: 98.5 F | OXYGEN SATURATION: 97 % | RESPIRATION RATE: 20 BRPM | HEART RATE: 83 BPM | HEIGHT: 63 IN | BODY MASS INDEX: 26.72 KG/M2 | DIASTOLIC BLOOD PRESSURE: 74 MMHG | SYSTOLIC BLOOD PRESSURE: 110 MMHG

## 2018-03-15 DIAGNOSIS — R10.10 UPPER ABDOMINAL PAIN: Primary | ICD-10-CM

## 2018-03-15 DIAGNOSIS — R10.9 ABDOMINAL PAIN, UNSPECIFIED ABDOMINAL LOCATION: ICD-10-CM

## 2018-03-15 LAB
BILIRUB UR QL STRIP: NEGATIVE
GLUCOSE UR-MCNC: NEGATIVE MG/DL
KETONES P FAST UR STRIP-MCNC: NEGATIVE MG/DL
PH UR STRIP: 5.5 [PH] (ref 4.6–8)
PROT UR QL STRIP: NEGATIVE
SP GR UR STRIP: 1.02 (ref 1–1.03)
UA UROBILINOGEN AMB POC: NORMAL (ref 0.2–1)
URINALYSIS CLARITY POC: CLEAR
URINALYSIS COLOR POC: YELLOW
URINE BLOOD POC: NORMAL
URINE LEUKOCYTES POC: NEGATIVE
URINE NITRITES POC: NEGATIVE

## 2018-03-15 RX ORDER — OMEPRAZOLE 20 MG/1
CAPSULE, DELAYED RELEASE ORAL
Qty: 1 CAP | Refills: 0
Start: 2018-03-15 | End: 2019-05-01 | Stop reason: SDUPTHER

## 2018-03-15 RX ORDER — ECONAZOLE NITRATE 10 MG/G
CREAM TOPICAL 2 TIMES DAILY
Refills: 7 | COMMUNITY
Start: 2017-12-21 | End: 2018-12-24 | Stop reason: ALTCHOICE

## 2018-03-15 NOTE — PROGRESS NOTES
HISTORY OF PRESENT ILLNESS  Laci Pro is a 38 y.o. female. Nausea    The history is provided by the patient. This is a new problem. Episode onset: 3 d. Episode frequency: no vomiting. The problem has been rapidly improving. There has been no fever. Associated symptoms include abdominal pain. Pertinent negatives include no chills, no fever, no diarrhea and no URI. Risk factors: none. Her pertinent negatives include no irritable bowel syndrome and no recent abdominal surgery. Abdominal Pain   The history is provided by the patient (bilateral upper abdomen, now minimal in the luq, 1/10 now , was 6/10 at worst). This is a new problem. Episode onset: 3 d. Associated symptoms include abdominal pain. Review of Systems   Constitutional: Negative for chills and fever. Gastrointestinal: Positive for abdominal pain and nausea. Negative for diarrhea. Physical Exam   Constitutional: No distress. Cardiovascular: Normal rate and regular rhythm. Exam reveals no gallop and no friction rub. No murmur heard. Pulmonary/Chest: Effort normal and breath sounds normal.   Abdominal: Soft. She exhibits no distension. There is no tenderness. There is no rigidity, no rebound and no guarding. Nursing note and vitals reviewed. ASSESSMENT and PLAN  Diagnoses and all orders for this visit:    1. Upper abdominal pain  -     omeprazole (PRILOSEC) 20 mg capsule; TAKE ONE CAPSULE BY MOUTH TWICE A DAY- x 7 days. If recurs, consider abdominal US. If refractory, consider See gastroenterologist as directed     2.  Abdominal pain, unspecified abdominal location  -     AMB POC URINALYSIS DIP STICK AUTO W/ MICRO- neg

## 2018-03-15 NOTE — MR AVS SNAPSHOT
[address] 
[phone] Patient: Jessy Palacio MRN:  [id number] Visit Information Date & Time Provider Department Dept. Phone Encounter #  
 3/15/2018  2:40 PM Jed Fleming, [facility] [phone] [id number] Upcoming Health Maintenance Date Due  
 PAP AKA CERVICAL CYTOLOGY 2018 COLONOSCOPY 2021 DTaP/Tdap/Td series (2 - Td) 3/21/2022 Allergies as of 3/15/2018  Review Complete On: 3/15/2018 By: Jed Fleming MD  
  
 Severity Noted Reaction Type Reactions Amoxicillin  2016    Swelling Swelling of hands and feet and rash Bactrim [Sulfamethoxazole-trimethoprim]  02/15/2010    Swelling, Myalgia Doxycycline  07/15/2013    Other (comments)  
 insomnia Suprax [Cefixime]  02/15/2010    Swelling, Myalgia Current Immunizations  Reviewed on 2016 Name Date TDAP Vaccine 3/21/2012  4:06 PM  
  
 Not reviewed this visit You Were Diagnosed With   
  
 Codes Comments Upper abdominal pain    -  Primary ICD-10-CM: R10.10 ICD-9-CM: 789.09 Abdominal pain, unspecified abdominal location     ICD-10-CM: R10.9 ICD-9-CM: 789.00 Vitals BP Pulse Temp Resp Height(growth percentile) Weight(growth percentile) 110/74 (BP 1 Location: Right arm, BP Patient Position: Sitting) 83 98.5 °F (36.9 °C) (Oral) 20 5' 3\" (1.6 m) 150 lb 12.8 oz (68.4 kg) LMP SpO2 BMI OB Status Smoking Status 2018 (Exact Date) 97% 26.71 kg/m2 Having regular periods Never Smoker BMI and BSA Data Body Mass Index Body Surface Area  
 26.71 kg/m 2 1.74 m 2 Preferred Pharmacy Pharmacy Name Phone [facility] [address] [facility] [phone] Your Updated Medication List  
  
   
This list is accurate as of 3/15/18  3:24 PM.  Always use your most recent med list.  
  
  
  
 acetaminophen-codeine 300-30 mg per tablet Commonly known as:  TYLENOL #3  
TAKE ONE TO TWO TABLETS BY MOUTH EVERY 6 HOURS AS NEEDED FOR SEVERE CRAMPS  
  
 aspirin delayed-release 81 mg tablet Take  by mouth daily. BUTALBITAL COMPOUND W/CODEINE 49--40 mg per capsule Generic drug:  codeine-butalbital-aspirin-caffeine TAKE ONE CAPSULE BY MOUTH EVERY 6 HOURS AS NEEDED FOR PAIN  
  
 BYSTOLIC 5 mg tablet Generic drug:  nebivolol MARLO 1/2 TABLET BY MOUTH ONCE DAILY  
  
 clonazePAM 1 mg tablet Commonly known as:  Birdie [place] Take 1 mg by mouth three (3) times daily. cyclobenzaprine 5 mg tablet Commonly known as:  FLEXERIL  
TAKE ONE TABLET BY MOUTH THREE TIMES A DAY AS NEEDED FOR MUSCLE SPASMS (MENSTRUAL CRAMPS)  
  
 econazole nitrate 1 % topical cream  
Commonly known as:  Inis Gianotti Apply  to affected area two (2) times a day. On feet EFFEXOR  mg capsule Generic drug:  venlafaxine-SR Take 75 mg by mouth every morning. Take 3 tabs daily. Brand Name. FINACEA 15 % topical gel Generic drug:  azelaic acid Apply  to affected area two (2) times a day. hyoscyamine SL 0.125 mg SL tablet Commonly known as:  LEVSIN/SL  
DISSOLVE ONE TABLET UNDER THE TONGUE EVERY 6 HOURS AS NEEDED FOR CRAMPING  
  
 omeprazole 20 mg capsule Commonly known as:  PRILOSEC  
TAKE ONE CAPSULE BY MOUTH TWICE A DAY- x 7 days  
  
 ondansetron 4 mg disintegrating tablet Commonly known as:  ZOFRAN ODT  
DISSOLVE ONE TABLET BY MOUTH EVERY 8 HOURS AS NEEDED FOR NAUSEA We Performed the Following AMB POC URINALYSIS DIP STICK AUTO W/ MICRO [20739 CPT(R)] Introducing [facility]! Dear Jordon Sol: Thank you for requesting a Casm[employer] account. Our records indicate that you already have an active Casmul account. You can access your account anytime at [email or web address].. [employer]/A [email or web address]. Did you know that you can access your hospital and ER discharge instructions at any time in [employer]? You can also review all of your test results from your hospital stay or ER visit. Additional Information If you have questions, please visit the Frequently Asked Questions section of the [employer] website at [email or web address] Remember, [employer] is NOT to be used for urgent needs. For medical emergencies, dial 911. Now available from your iPhone and Android! Please provide this summary of care documentation to your next provider. Your primary care clinician is listed as SHAY BUI. If you have any questions after today's visit, please call [phone].

## 2018-04-21 DIAGNOSIS — N94.6 SEVERE MENSTRUAL CRAMPS: ICD-10-CM

## 2018-04-23 RX ORDER — ACETAMINOPHEN AND CODEINE PHOSPHATE 300; 30 MG/1; MG/1
TABLET ORAL
Qty: 30 TAB | Refills: 2 | OUTPATIENT
Start: 2018-04-23 | End: 2018-06-04

## 2018-04-23 NOTE — TELEPHONE ENCOUNTER
Called [facility] pharmacy and spoke to the pharmacist-Chhaya and gave the verbal order for refill on acetaminophen-codeine 300-30mg per tablet- take 1-2 tabs by mouth every 6 hrs as needed for severe cramps. Dispense #30, refills-2.

## 2018-06-04 DIAGNOSIS — N94.6 SEVERE MENSTRUAL CRAMPS: Primary | ICD-10-CM

## 2018-06-04 RX ORDER — BUTALBITAL, ACETAMINOPHEN, CAFFEINE AND CODEINE PHOSPHATE 50; 325; 40; 30 MG/1; MG/1; MG/1; MG/1
1 CAPSULE ORAL
Qty: 30 CAP | Refills: 3 | OUTPATIENT
Start: 2018-06-04 | End: 2019-03-10 | Stop reason: SDUPTHER

## 2018-07-03 RX ORDER — ONDANSETRON 4 MG/1
TABLET, ORALLY DISINTEGRATING ORAL
Qty: 30 TAB | Refills: 0 | Status: SHIPPED | OUTPATIENT
Start: 2018-07-03 | End: 2018-09-16 | Stop reason: SDUPTHER

## 2018-07-03 NOTE — TELEPHONE ENCOUNTER
Spoke with pt - advised MD out of office until 7/9/18. Asked if she had enough until he returns. She does not and she is going out of town on vacation on Friday 7/6/18. She uses this for when she gets a migraine. Will forward to MD on call Dr Francisca Lema.

## 2018-08-26 DIAGNOSIS — R10.9 ABDOMINAL CRAMPING: ICD-10-CM

## 2018-08-26 RX ORDER — HYOSCYAMINE SULFATE 0.12 MG/1
TABLET SUBLINGUAL
Qty: 30 TAB | Refills: 10 | Status: SHIPPED | COMMUNITY
Start: 2018-08-26 | End: 2021-04-16 | Stop reason: ALTCHOICE

## 2018-09-11 ENCOUNTER — DOCUMENTATION ONLY (OUTPATIENT)
Dept: INTERNAL MEDICINE CLINIC | Age: 43
End: 2018-09-11

## 2018-09-11 DIAGNOSIS — N39.0 URINARY TRACT INFECTION WITHOUT HEMATURIA, SITE UNSPECIFIED: Primary | ICD-10-CM

## 2018-09-11 NOTE — PROGRESS NOTES
[facility] in [place], [place] (pt preferred) - spoke with Parminder Hong for fax # [phone]. Faxed lab slips for urinalysis and urine culture - noted on fax there are 2 separate lab slip for this. Confirmation received.

## 2018-09-13 LAB
APPEARANCE UR: CLEAR
BACTERIA UR CULT: NO GROWTH
BILIRUB UR QL STRIP: NEGATIVE
COLOR UR: YELLOW
GLUCOSE UR QL: NEGATIVE
HGB UR QL STRIP: NEGATIVE
KETONES UR QL STRIP: NEGATIVE
LEUKOCYTE ESTERASE UR QL STRIP: NEGATIVE
MICRO URNS: NORMAL
NITRITE UR QL STRIP: NEGATIVE
PH UR STRIP: 7 [PH] (ref 5–7.5)
PROT UR QL STRIP: NEGATIVE
SP GR UR: 1.01 (ref 1–1.03)
UROBILINOGEN UR STRIP-MCNC: 0.2 MG/DL (ref 0.2–1)

## 2018-09-17 RX ORDER — ONDANSETRON 4 MG/1
4 TABLET, ORALLY DISINTEGRATING ORAL
Qty: 30 TAB | Refills: 0 | Status: SHIPPED | OUTPATIENT
Start: 2018-09-17 | End: 2019-02-18 | Stop reason: SDUPTHER

## 2018-09-17 NOTE — TELEPHONE ENCOUNTER
From: Odessa Barros  To: Bao Dawn MD  Sent: 9/16/2018 12:05 PM EDT  Subject: Medication Renewal Request    Original authorizing provider: MD Odessa Kennedy would like a refill of the following medications:  ondansetron (ZOFRAN ODT) 4 mg disintegrating tablet Bao Dawn MD]    Preferred pharmacy: [address]    Comment:  Dr. Jt Davidson, You were out of the office and Dr. Wilda Morataya filled this prescription the last time. If my pharmacy puts a refill request in, it keeps going to him. Can you please refill this prescription and send the request to my [facility]. I took more than usual because the Macrobid antibiotic made me very nauseous.  Breanna Ascencio

## 2018-10-13 DIAGNOSIS — N94.6 SEVERE MENSTRUAL CRAMPS: Primary | ICD-10-CM

## 2018-10-16 RX ORDER — ACETAMINOPHEN AND CODEINE PHOSPHATE 300; 30 MG/1; MG/1
TABLET ORAL
Qty: 30 TAB | Refills: 1 | OUTPATIENT
Start: 2018-10-16 | End: 2019-01-28 | Stop reason: SDUPTHER

## 2018-10-16 NOTE — TELEPHONE ENCOUNTER
Phoned in prescription below to patients pharmacy on file - verbal order received : Dr Kathleen Bonilla.

## 2018-11-20 NOTE — ED TRIAGE NOTES
ALBUTEROL- Medication is not on patient's list,   Called and left message for patient to confirm who prescribed medication and dosage or what pharmacy she has been getting it filled at to confirm. Left leg hurting a very little for a couple days then when walking up stairs yesterday left calf started hurting more. Initially thought it was a pulled muscle but muscle relaxer and creme and pain medicine have not helped and would like to be scammed for possible blood clot since I have a clotting problem.

## 2018-11-29 RX ORDER — CYCLOBENZAPRINE HCL 5 MG
TABLET ORAL
Qty: 30 TAB | Refills: 2 | Status: SHIPPED | OUTPATIENT
Start: 2018-11-29 | End: 2019-05-01 | Stop reason: SDUPTHER

## 2018-12-24 ENCOUNTER — OFFICE VISIT (OUTPATIENT)
Dept: INTERNAL MEDICINE CLINIC | Age: 43
End: 2018-12-24

## 2018-12-24 VITALS
SYSTOLIC BLOOD PRESSURE: 112 MMHG | DIASTOLIC BLOOD PRESSURE: 80 MMHG | BODY MASS INDEX: 26.65 KG/M2 | RESPIRATION RATE: 16 BRPM | WEIGHT: 150.4 LBS | TEMPERATURE: 99 F | HEIGHT: 63 IN | HEART RATE: 103 BPM | OXYGEN SATURATION: 96 %

## 2018-12-24 DIAGNOSIS — J20.9 ACUTE BRONCHITIS, UNSPECIFIED ORGANISM: ICD-10-CM

## 2018-12-24 DIAGNOSIS — J68.3: Primary | ICD-10-CM

## 2018-12-24 RX ORDER — AZITHROMYCIN 250 MG/1
250 TABLET, FILM COATED ORAL SEE ADMIN INSTRUCTIONS
Qty: 6 TAB | Refills: 0 | Status: SHIPPED | OUTPATIENT
Start: 2018-12-24 | End: 2018-12-29

## 2018-12-24 RX ORDER — BUDESONIDE AND FORMOTEROL FUMARATE DIHYDRATE 160; 4.5 UG/1; UG/1
2 AEROSOL RESPIRATORY (INHALATION) 2 TIMES DAILY
Qty: 1 INHALER | Refills: 0 | Status: SHIPPED | COMMUNITY
Start: 2018-12-24 | End: 2019-08-05 | Stop reason: ALTCHOICE

## 2018-12-24 NOTE — PROGRESS NOTES
Wyatt Nuñez is a 89 [employer] y.o. female     Chief Complaint   Patient presents with    Nasal Congestion     chills x2 and chest congestion for 12 days      1. Have you been to the ER, urgent care clinic since your last visit? Hospitalized since your last visit? No    2. Have you seen or consulted any other health care providers outside of the [address] [place] [address] since your last visit? Include any pap smears or colon screening.  No    Visit Vitals  /80 (BP 1 Location: Left arm, BP Patient Position: Sitting)   Pulse (!) 103   Temp 99 °F (37.2 °C)   Resp 16   Ht 5' 3\" (1.6 m)   Wt 150 lb 6.4 oz (68.2 kg)   SpO2 96%   BMI 26.64 kg/m²

## 2018-12-24 NOTE — PROGRESS NOTES
HISTORY OF PRESENT ILLNESS  Maycol Cervantes is a 41 y.o. female. Nasal Congestion    The history is provided by the patient and parent. This is a new problem. Episode onset: 12 d. The problem has been gradually worsening. There has been a fever of 100 - 100.9 F. The fever has been present for 1 - 2 days. The pain is at a severity of 1/10. Associated symptoms include chills, sweats, congestion, sinus pressure, cough and headaches. Pertinent negatives include no sore throat and no shortness of breath. Treatments tried: dayquil, mucinex, T # 3 for hs cough suppression. Review of Systems   Constitutional: Positive for chills. HENT: Positive for congestion and sinus pressure. Negative for sore throat. Respiratory: Positive for cough. Negative for shortness of breath. Neurological: Positive for headaches. Physical Exam   Constitutional: No distress. HENT:   Right Ear: Tympanic membrane and ear canal normal.   Left Ear: Tympanic membrane and ear canal normal.   Nose: Right sinus exhibits frontal sinus tenderness. Right sinus exhibits no maxillary sinus tenderness. Left sinus exhibits frontal sinus tenderness. Left sinus exhibits no maxillary sinus tenderness. Mouth/Throat: Posterior oropharyngeal edema and posterior oropharyngeal erythema present. No oropharyngeal exudate. Eyes: Conjunctivae are normal. Right eye exhibits no discharge. Left eye exhibits no discharge. Neck: Neck supple. Cardiovascular: Normal rate and regular rhythm. Exam reveals no gallop and no friction rub. No murmur heard. Pulmonary/Chest: Effort normal. She has wheezes. Trace with forced exp left lung fields   Lymphadenopathy:     She has no cervical adenopathy. Nursing note and vitals reviewed. ASSESSMENT and PLAN  Diagnoses and all orders for this visit:    1.  Mild persistent reactive airways dysfunction syndrome without complication ([address]- post viral  -     budesonide-formoterol (SYMBICORT) 160-4.5 mcg/actuation HFAA; Take 2 Puffs by inhalation two (2) times a day. 2. Acute bronchitis, unspecified organism  -     azithromycin (ZITHROMAX) 250 mg tablet; Take 1 Tab by mouth See Admin Instructions for 5 days.   - Continue other medications in addition to current changes     Plan was reviewed with patient and family, understanding expressed

## 2018-12-24 NOTE — PATIENT INSTRUCTIONS
Bronchitis: Care Instructions  Your Care Instructions    Bronchitis is inflammation of the bronchial tubes, which carry air to the lungs. The tubes swell and produce mucus, or phlegm. The mucus and inflamed bronchial tubes make you cough. You may have trouble breathing. Most cases of bronchitis are caused by viruses like those that cause colds. Antibiotics usually do not help and they may be harmful. Bronchitis usually develops rapidly and lasts about 2 to 3 weeks in otherwise healthy people. Follow-up care is a key part of your treatment and safety. Be sure to make and go to all appointments, and call your doctor if you are having problems. It's also a good idea to know your test results and keep a list of the medicines you take. How can you care for yourself at home? · Take all medicines exactly as prescribed. Call your doctor if you think you are having a problem with your medicine. · Get some extra rest.  · Take an over-the-counter pain medicine, such as acetaminophen (Tylenol), ibuprofen (Advil, Motrin), or naproxen (Aleve) to reduce fever and relieve body aches. Read and follow all instructions on the label. · Do not take two or more pain medicines at the same time unless the doctor told you to. Many pain medicines have acetaminophen, which is Tylenol. Too much acetaminophen (Tylenol) can be harmful. · Take an over-the-counter cough medicine that contains dextromethorphan to help quiet a dry, hacking cough so that you can sleep. Avoid cough medicines that have more than one active ingredient. Read and follow all instructions on the label. · Breathe moist air from a humidifier, hot shower, or sink filled with hot water. The heat and moisture will thin mucus so you can cough it out. · Do not smoke. Smoking can make bronchitis worse. If you need help quitting, talk to your doctor about stop-smoking programs and medicines. These can increase your chances of quitting for good.   When should you call for help? Call 911 anytime you think you may need emergency care. For example, call if:    · You have severe trouble breathing.    Call your doctor now or seek immediate medical care if:    · You have new or worse trouble breathing.     · You cough up dark brown or bloody mucus (sputum).     · You have a new or higher fever.     · You have a new rash.    Watch closely for changes in your health, and be sure to contact your doctor if:    · You cough more deeply or more often, especially if you notice more mucus or a change in the color of your mucus.     · You are not getting better as expected. Where can you learn more? Go to http://thais-ada.info/. Enter H333 in the search box to learn more about \"Bronchitis: Care Instructions. \"  Current as of: December 6, 2017  Content Version: 11.8  © 6001-3091 [employer]. Care instructions adapted under license by [employer] (which disclaims liability or warranty for this information). If you have questions about a medical condition or this instruction, always ask your healthcare professional. [place] [address] any warranty or liability for your use of this information. Wheezing or Bronchoconstriction: Care Instructions  Your Care Instructions  Wheezing is a whistling noise made during breathing. It occurs when the small airways, or bronchial tubes, that lead to your lungs swell or contract (spasm) and become narrow. This narrowing is called bronchoconstriction. When your airways constrict, it is hard for air to pass through and this makes it hard for you to breathe. Wheezing and bronchoconstriction can be caused by many problems, including:  · An infection such as the flu or a cold. · Allergies such as hay fever. · Diseases such as asthma or chronic obstructive pulmonary disease. · Smoking. Treatment for your wheezing depends on what is causing the problem.  Your wheezing may get better without treatment. But you may need to pay attention to things that cause your wheezing and avoid them. Or you may need medicine to help treat the wheezing and to reduce the swelling or to relieve spasms in your lungs. Follow-up care is a key part of your treatment and safety. Be sure to make and go to all appointments, and call your doctor if you are having problems. It is also a good idea to know your test results and keep a list of the medicines you take. How can you care for yourself at home? · Take your medicine exactly as prescribed. Call your doctor if you think you are having a problem with your medicine. You will get more details on the specific medicine your doctor prescribes. · If your doctor prescribed antibiotics, take them as directed. Do not stop taking them just because you feel better. You need to take the full course of antibiotics. · Breathe moist air from a humidifier, hot shower, or sink filled with hot water. This may help ease your symptoms and make it easier for you to breathe. · If you have congestion in your nose and throat, drinking plenty of fluids, especially hot fluids, may help relieve your symptoms. If you have kidney, heart, or liver disease and have to limit fluids, talk with your doctor before you increase the amount of fluids you drink. · If you have mucus in your airways, it may help to breathe deeply and cough. · Do not smoke or allow others to smoke around you. Smoking can make your wheezing worse. If you need help quitting, talk to your doctor about stop-smoking programs and medicines. These can increase your chances of quitting for good. · Avoid things that may cause your wheezing. These may include colds, smoke, air pollution, dust, pollen, pets, cockroaches, stress, and cold air. When should you call for help? Call 911 anytime you think you may need emergency care. For example, call if:    · You have severe trouble breathing.     · You passed out (lost consciousness).  Call your doctor now or seek immediate medical care if:    · You cough up yellow, dark brown, or bloody mucus (sputum).     · You have new or worse shortness of breath.     · Your wheezing is not getting better or it gets worse after you start taking your medicine.    Watch closely for changes in your health, and be sure to contact your doctor if:    · You do not get better as expected. Where can you learn more? Go to http://thais-ada.info/. Enter 454 4546 in the search box to learn more about \"Wheezing or Bronchoconstriction: Care Instructions. \"  Current as of: December 6, 2017  Content Version: 11.8  © 3403-3706 [employer]. Care instructions adapted under license by [employer] (which disclaims liability or warranty for this information). If you have questions about a medical condition or this instruction, always ask your healthcare professional. [address] any warranty or liability for your use of this information.

## 2018-12-28 ENCOUNTER — TELEPHONE (OUTPATIENT)
Dept: INTERNAL MEDICINE CLINIC | Age: 43
End: 2018-12-28

## 2018-12-28 NOTE — TELEPHONE ENCOUNTER
Spoke with pt - advised her MD out of office until 1/7/19. She states she is feeling better but today cough was a little worse. Today was her last dose of antibiotic. She is using inhaler and taking Mucinex. Cough is not productive, no fever and no congestion. Advised her to rest over weekend and if symptoms worsen over to go to [facility] or call office on Monday to schedule appt for further evaluation.  Will forward to MD.

## 2018-12-28 NOTE — TELEPHONE ENCOUNTER
Regarding: RE: Non-Urgent Medical Question  Contact: [phone]  ----- Message from [address] [place] [address], [facility] sent at 12/28/2018  2:00 PM EST -----    Dr. George Rice,  As of yesterday I have been improving. My cough was much better yesterday and I didn't need to take the dayquil and I was able to sleep well finally. I am still taking the inhaler, Muscinex, and my last day on the zpac. I was a bit confused this morning when I woke up and my cough was worse. It is not terrible but is worse then the prior days. It sounds deeper and wet and more phlegmy (it is hard to explain). I still feel good (much better than when I came in to see you), but wanted to find out it I needed to be concerned about this development or just keep an eye on it? What should I look for to know if I need to message you back or come in for an appointment? Oh, I am not coughing anything up. Marquise Blackmon  ----- Message -----  From: Kay Rosario LPN  Sent: 88/42/2087 11:15 AM EST  To: Melisa Alonso  Subject: RE: Non-Urgent Medical Question  Hi Ms Anam Qiu,  After I called you I saw you had an appt with Dr George Rice this morning. Busy morning here. Hope you are feeling better soon. Have a great day and Merry [place]! Lupe Junes    ----- Message -----     From: Melisa Alonso     Sent: 12/21/2018  4:22 PM EST       To: Mary Lou Smith MD  Subject: Non-Urgent Medical Question    Dr. George Rice,  I have had a cold for the past 12 days (no fever, or chills). Mainly a chest cold. I am taking Mucinex and Dayquil and in the evening once in a while a Tyl #3 since the codeine would help my cough so I could sleep. I am no longer coughing up anything or having any yucky colored drainage. However, my cough (which is very dry) is worse and I seem to be having some spasms. That is the only thing really bothering me. Do you think I should be taking an inhaler or something over the counter to help with the inflammation?   If I need an inhaler, would you prescribe one?  Thank you and have a great holiday.   Pooja Wynn

## 2019-01-04 ENCOUNTER — TELEPHONE (OUTPATIENT)
Dept: INTERNAL MEDICINE CLINIC | Age: 44
End: 2019-01-04

## 2019-01-04 NOTE — TELEPHONE ENCOUNTER
Spoke with pt - states she saw MD on 12/24/18 for nasal congestion and cough. Started on antibiotic, mucinex and inhaler. Was beginning to feel better. Has completed antibiotic. About 2 days ago started with congestion and light cough again. No fever. Still using inhaler. Wanted to know if MD will send in another round of antibiotics? Advised her he is out of office until 1/7/19. I could see if partner has appt to see her today - she declined. If had to see MD wanted to see her pcp. Scheduled for 1/7/19 at 4:50 pm. Advised her if he sends in antibiotic will cancel appt.  Will forward to MD.

## 2019-01-07 NOTE — TELEPHONE ENCOUNTER
Spoke with pt - states she has already called and canceled her appt for today. She is feeling much better. Guess she needed to give herself a few more days to feel better. Did not need antibiotic now.  Will forward to MD.

## 2019-01-24 ENCOUNTER — TELEPHONE (OUTPATIENT)
Dept: INTERNAL MEDICINE CLINIC | Age: 44
End: 2019-01-24

## 2019-01-24 ENCOUNTER — OFFICE VISIT (OUTPATIENT)
Dept: INTERNAL MEDICINE CLINIC | Age: 44
End: 2019-01-24

## 2019-01-24 ENCOUNTER — HOSPITAL ENCOUNTER (OUTPATIENT)
Dept: ULTRASOUND IMAGING | Age: 44
Discharge: HOME | End: 2019-01-24
Payer: COMMERCIAL

## 2019-01-24 VITALS
WEIGHT: 152 LBS | BODY MASS INDEX: 26.93 KG/M2 | HEIGHT: 63 IN | SYSTOLIC BLOOD PRESSURE: 106 MMHG | OXYGEN SATURATION: 98 % | HEART RATE: 74 BPM | TEMPERATURE: 99.2 F | RESPIRATION RATE: 17 BRPM | DIASTOLIC BLOOD PRESSURE: 68 MMHG

## 2019-01-24 DIAGNOSIS — R10.84 INTERMITTENT GENERALIZED ABDOMINAL PAIN: Primary | ICD-10-CM

## 2019-01-24 DIAGNOSIS — R10.10 UPPER ABDOMINAL PAIN: ICD-10-CM

## 2019-01-24 DIAGNOSIS — R10.10 UPPER ABDOMINAL PAIN: Primary | ICD-10-CM

## 2019-01-24 PROCEDURE — 76700 US EXAM ABDOM COMPLETE: CPT

## 2019-01-24 RX ORDER — LOTEPREDNOL ETABONATE 5 MG/ML
SUSPENSION/ DROPS OPHTHALMIC
COMMUNITY
Start: 2019-01-24 | End: 2019-08-05 | Stop reason: ALTCHOICE

## 2019-01-24 NOTE — TELEPHONE ENCOUNTER
Regarding: FW: Non-Urgent Medical Question  Contact: [phone]  Call- get actual detail of symptom location, type of pain, etc. May try to get [address] before visit  ----- Message -----  From: Keely Benites LPN  Sent: 7/60/7771   9:49 AM  To: Apollo Perez MD  Subject: FW: Non-Urgent Medical Question                      ----- Message -----  From: Jessica Engel  Sent: 1/24/2019   9:45 AM  To: Александр Redman [facility]  Subject: Non-Urgent Medical Question                      ----- Message from [address] [place] [address]51, [facility] sent at 1/24/2019  9:45 AM EST -----    Part1:  Dr. Samuel Ann,  I made an appt to come in and see you about my abdominal pain. I have seen you twice about this: 1st time you thought it was a stomach bug & the 2nd a pulled muscle. You mentioned if it came back that you would order a test like an ultrasound. It has come back several times & I have not come in to see you. I have been tracking it & I had the issue Sept 27th, Nov 4th, and now Jan 23rd. I seems to correlate with my ovulation (possibly) but, the pain is not in the correct spot.   My message was too long so See next message for Part 2:  Xiang Peoples

## 2019-01-24 NOTE — TELEPHONE ENCOUNTER
Advised pt she is scheduled at [address] [place] [address] for stat US of Abdomen today at 4 pm. Pt is aware of appt location and to arrive by 3:45 pm.

## 2019-01-24 NOTE — TELEPHONE ENCOUNTER
Spoke with pt - MD wanted more details on her symptoms. She states she has had this multiple times and has seen MD for this. This episode started yesterday suddenly under rib cage - dull constant pain. It radiates to back. Nauseated at first but goes away. No vomiting, diarrhea or fever. Usually last about 3 days. Would like to have testing now since she is having symptoms.  Pt last had something to eat today at 11:45 am. Will forward to MD.

## 2019-01-24 NOTE — PROGRESS NOTES
HISTORY OF PRESENT ILLNESS  Lexii Foster is a 41 y.o. female. HPI Subjective:  Olvin Jones is seen today for evaluation of recurrent abdominal pain. She developed abdominal pain 24 hours ago. It starts in the bilateral upper abdomen quadrants and then will generalize to the entire abdomen, once the pain progresses it can be quite severe. She has had intermittent spells of this and has never really determined a particular pattern. We asked to examine her if at all possible when she had a recurrence and she was able to come in today. We also obtained an abdominal ultrasound prior to the visit and this was unremarkable. Of note, her pain does worsen when she eats. Treatments include analgesics and GI medications, none of which seem to help a great deal.    Plan:  1. Discussed further evaluation with a GI consultation to assess for IBS, possible gallbladder dysfunction or other intraabdominal functional issues. 2. ER red flags were reviewed if she has worsening pain. Review of Systems   Constitutional: Negative for chills and fever. Gastrointestinal: Positive for abdominal pain. Negative for blood in stool, diarrhea, melena, nausea and vomiting. Physical Exam   Constitutional: No distress. Cardiovascular: Normal rate and regular rhythm. Exam reveals no gallop and no friction rub. No murmur heard. Pulmonary/Chest: Effort normal and breath sounds normal.   Abdominal: Soft. She exhibits distension. There is no hepatosplenomegaly. There is generalized tenderness. There is no rigidity, no rebound and no guarding. Nursing note and vitals reviewed. ASSESSMENT and PLAN  Diagnoses and all orders for this visit:    1.  Intermittent generalized abdominal pain  -     REFERRAL TO GASTROENTEROLOGY

## 2019-01-28 DIAGNOSIS — N94.6 SEVERE MENSTRUAL CRAMPS: ICD-10-CM

## 2019-01-29 RX ORDER — ACETAMINOPHEN AND CODEINE PHOSPHATE 300; 30 MG/1; MG/1
TABLET ORAL
Qty: 30 TAB | Refills: 0 | OUTPATIENT
Start: 2019-01-29 | End: 2019-03-09 | Stop reason: SDUPTHER

## 2019-01-29 NOTE — TELEPHONE ENCOUNTER
Phoned in prescription below to patients pharmacy on file - verbal order received : Dr Talisha Mitchell.

## 2019-02-19 RX ORDER — ONDANSETRON 4 MG/1
TABLET, ORALLY DISINTEGRATING ORAL
Qty: 30 TAB | Refills: 0 | Status: SHIPPED | OUTPATIENT
Start: 2019-02-19 | End: 2019-05-28 | Stop reason: SDUPTHER

## 2019-03-09 DIAGNOSIS — N94.6 SEVERE MENSTRUAL CRAMPS: ICD-10-CM

## 2019-03-10 DIAGNOSIS — N94.6 SEVERE MENSTRUAL CRAMPS: ICD-10-CM

## 2019-03-11 RX ORDER — BUTALBITAL, ACETAMINOPHEN, CAFFEINE AND CODEINE PHOSPHATE 50; 325; 40; 30 MG/1; MG/1; MG/1; MG/1
CAPSULE ORAL
Qty: 30 CAP | Refills: 0 | OUTPATIENT
Start: 2019-03-11 | End: 2019-11-24 | Stop reason: SDUPTHER

## 2019-03-11 RX ORDER — NEBIVOLOL HYDROCHLORIDE 5 MG/1
TABLET ORAL
Qty: 45 TAB | Refills: 3 | Status: SHIPPED | OUTPATIENT
Start: 2019-03-11 | End: 2020-02-28 | Stop reason: SDUPTHER

## 2019-03-11 RX ORDER — ACETAMINOPHEN AND CODEINE PHOSPHATE 300; 30 MG/1; MG/1
TABLET ORAL
Qty: 30 TAB | Refills: 0 | OUTPATIENT
Start: 2019-03-11 | End: 2019-11-27 | Stop reason: SDUPTHER

## 2019-03-12 NOTE — TELEPHONE ENCOUNTER
Phoned in prescription below to patients pharmacy on file - verbal order received : Dr Abimael Martinez.

## 2019-03-12 NOTE — TELEPHONE ENCOUNTER
Phoned in prescription below to patients pharmacy on file - verbal order received : Dr Madan Frederick.

## 2019-04-30 DIAGNOSIS — R10.10 UPPER ABDOMINAL PAIN: ICD-10-CM

## 2019-05-01 RX ORDER — OMEPRAZOLE 20 MG/1
CAPSULE, DELAYED RELEASE ORAL
Qty: 60 CAP | Refills: 7 | Status: SHIPPED | OUTPATIENT
Start: 2019-05-01 | End: 2019-11-22

## 2019-05-01 RX ORDER — CYCLOBENZAPRINE HCL 5 MG
TABLET ORAL
Qty: 30 TAB | Refills: 1 | Status: SHIPPED | OUTPATIENT
Start: 2019-05-01 | End: 2019-10-27 | Stop reason: SDUPTHER

## 2019-05-16 DIAGNOSIS — N94.6 MENSTRUAL PAIN: Primary | ICD-10-CM

## 2019-05-17 RX ORDER — ACETAMINOPHEN AND CODEINE PHOSPHATE 300; 30 MG/1; MG/1
TABLET ORAL
Qty: 30 TAB | Refills: 0 | OUTPATIENT
Start: 2019-05-17 | End: 2019-11-22

## 2019-05-17 NOTE — TELEPHONE ENCOUNTER
Phoned in prescription below to patients pharmacy on file - verbal order received : Dr Selma Steele.

## 2019-05-24 DIAGNOSIS — G43.709 CHRONIC MIGRAINE WITHOUT AURA WITHOUT STATUS MIGRAINOSUS, NOT INTRACTABLE: Primary | ICD-10-CM

## 2019-05-28 RX ORDER — ONDANSETRON 4 MG/1
TABLET, ORALLY DISINTEGRATING ORAL
Qty: 30 TAB | Refills: 0 | Status: SHIPPED | OUTPATIENT
Start: 2019-05-28 | End: 2019-09-14 | Stop reason: SDUPTHER

## 2019-05-28 RX ORDER — BUTALBITAL, ACETAMINOPHEN, CAFFEINE AND CODEINE PHOSPHATE 50; 325; 40; 30 MG/1; MG/1; MG/1; MG/1
CAPSULE ORAL
Qty: 30 CAP | Refills: 0 | OUTPATIENT
Start: 2019-05-28 | End: 2019-11-22

## 2019-05-29 NOTE — TELEPHONE ENCOUNTER
Phoned in prescription below to patients pharmacy on file - verbal order received : Dr Howie Sanders.

## 2019-08-05 ENCOUNTER — OFFICE VISIT (OUTPATIENT)
Dept: INTERNAL MEDICINE CLINIC | Age: 44
End: 2019-08-05

## 2019-08-05 ENCOUNTER — TELEPHONE (OUTPATIENT)
Dept: INTERNAL MEDICINE CLINIC | Age: 44
End: 2019-08-05

## 2019-08-05 VITALS
OXYGEN SATURATION: 97 % | SYSTOLIC BLOOD PRESSURE: 116 MMHG | HEIGHT: 63 IN | BODY MASS INDEX: 27.64 KG/M2 | HEART RATE: 75 BPM | TEMPERATURE: 98.1 F | WEIGHT: 156 LBS | DIASTOLIC BLOOD PRESSURE: 82 MMHG | RESPIRATION RATE: 17 BRPM

## 2019-08-05 DIAGNOSIS — L50.9 HIVES: Primary | ICD-10-CM

## 2019-08-05 DIAGNOSIS — Z00.00 LABORATORY TESTS ORDERED AS PART OF A COMPLETE PHYSICAL EXAM (CPE): ICD-10-CM

## 2019-08-05 RX ORDER — DIPHENHYDRAMINE HCL 25 MG
25 CAPSULE ORAL
Qty: 30 CAP | Refills: 1
Start: 2019-08-05 | End: 2019-08-15

## 2019-08-05 NOTE — PROGRESS NOTES
1. Have you been to the ER, urgent care clinic since your last visit? No   Hospitalized since your last visit? No      2. Have you seen or consulted any other health care providers outside of the [address] [place] [address] since your last visit?    No

## 2019-08-05 NOTE — PATIENT INSTRUCTIONS
Hives: Care Instructions  Your Care Instructions  Hives are raised, red, itchy patches of skin. They are also called wheals or welts. They usually have red borders and pale centers. Hives range in size from ¼ inch to 3 inches or more across. They may seem to move from place to place on the skin. Several hives may form a large area of raised, red skin. You can get hives after an insect sting, after taking medicine or eating certain foods, or because of infection or stress. Other causes include plants, things you breathe in, makeup, heat, cold, sunlight, and latex. You cannot spread hives to other people. Hives may last a few minutes or a few days, but a single spot may last less than 36 hours. Follow-up care is a key part of your treatment and safety. Be sure to make and go to all appointments, and call your doctor if you are having problems. It's also a good idea to know your test results and keep a list of the medicines you take. How can you care for yourself at home? · Avoid whatever you think may have caused your hives, such as a certain food or medicine. However, you may not know the cause. · Put a cool, wet towel on the area to relieve itching. · Take an over-the-counter antihistamine, such as diphenhydramine (Benadryl), cetirizine (Zyrtec), or loratadine (Claritin), to help stop the hives and calm the itching. Read and follow directions on the label. These medicines can make you feel sleepy. Do not drive while using them. · Stay away from strong soaps, detergents, and chemicals. These can make itching worse. When should you call for help? Call 911 anytime you think you may need emergency care. For example, call if:    · You have symptoms of a severe allergic reaction. These may include:  ? Sudden raised, red areas (hives) all over your body. ? Swelling of the throat, mouth, lips, or tongue. ? Trouble breathing. ? Passing out (losing consciousness).  Or you may feel very lightheaded or suddenly feel weak, confused, or restless.    Call your doctor now or seek immediate medical care if:    · You have symptoms of an allergic reaction, such as:  ? A rash or hives (raised, red areas on the skin). ? Itching. ? Swelling. ? Belly pain, nausea, or vomiting.     · You get hives after you start a new medicine.     · Hives have not gone away after 24 hours.    Watch closely for changes in your health, and be sure to contact your doctor if:    · You do not get better as expected. Where can you learn more? Go to http://thais-ada.info/. Enter N473 in the search box to learn more about \"Hives: Care Instructions. \"  Current as of: September 23, 2018  Content Version: 12.1  © 1038-6321 [employer]. Care instructions adapted under license by [employer] (which disclaims liability or warranty for this information). If you have questions about a medical condition or this instruction, always ask your healthcare professional. [address] any warranty or liability for your use of this information.

## 2019-08-05 NOTE — PROGRESS NOTES
HISTORY OF PRESENT ILLNESS  Kanika Martin is a 45 y.o. female. Rash    The history is provided by the patient. This is a new problem. The current episode started 12 to 24 hours ago. The problem has not changed since onset. The problem is associated with an unknown (is new to CBD oil, advised she hold this a week or two,, then rechallenge. Also ate at parents, served a dish not previously had) factor. There has been no fever. Affected Location: generalized, mainly arms and trunk. The patient is experiencing no pain. The pain has been constant since onset. Associated symptoms include itching and hives. Pertinent negatives include no blisters, no pain and no weeping. She has tried nothing for the symptoms. Review of Systems   Constitutional: Negative for chills and fever. Gastrointestinal: Negative for abdominal pain, diarrhea, nausea and vomiting. Musculoskeletal: Negative for myalgias. Skin: Positive for itching and rash. Neurological: Positive for headaches. 2/10, typical migraine, not relating to rash       Physical Exam   Constitutional: No distress. Eyes: Conjunctivae are normal. Right eye exhibits no discharge. Left eye exhibits no discharge. Scleral icterus is present. Neck: Neck supple. Cardiovascular: Normal rate and regular rhythm. Exam reveals no gallop and no friction rub. No murmur heard. Pulmonary/Chest: Effort normal and breath sounds normal.   Lymphadenopathy:     She has no cervical adenopathy. Skin: Rash noted. Rash is urticarial. Rash is not pustular and not vesicular. Nursing note and vitals reviewed. ASSESSMENT and PLAN  Diagnoses and all orders for this visit:    1. Hives  -     diphenhydrAMINE (BENADRYL) 25 mg capsule; Take 1 Cap by mouth every six (6) hours as needed (rash) for up to 10 days. Indications: hives  - Consider prednisone if persists    2.  Laboratory tests ordered as part of a complete physical exam (CPE)  -     METABOLIC PANEL, COMPREHENSIVE  - CBC WITH AUTOMATED DIFF  -     LIPID PANEL

## 2019-08-05 NOTE — TELEPHONE ENCOUNTER
----- Message from Boaz Mckinney sent at 8/5/2019  7:23 AM EDT -----  Regarding: Non-Urgent Medical Question  Contact: [phone]  Dr. Marina Perez,    I have requested an appointment for this morning on my chart. I woke up with itchy dime size red spots on my extremities and a severe headache. I am concerned about being contagious and going to work. One of my co-worker is pregnant.     Gem Verdugo   [phone]

## 2019-08-07 DIAGNOSIS — N94.6 SEVERE MENSTRUAL CRAMPS: Primary | ICD-10-CM

## 2019-08-08 RX ORDER — ACETAMINOPHEN AND CODEINE PHOSPHATE 300; 30 MG/1; MG/1
TABLET ORAL
Qty: 30 TAB | Refills: 0 | OUTPATIENT
Start: 2019-08-08 | End: 2019-11-22

## 2019-09-10 ENCOUNTER — HOSPITAL ENCOUNTER (EMERGENCY)
Age: 44
Discharge: HOME | End: 2019-09-10
Attending: STUDENT IN AN ORGANIZED HEALTH CARE EDUCATION/TRAINING PROGRAM
Payer: COMMERCIAL

## 2019-09-10 ENCOUNTER — APPOINTMENT (OUTPATIENT)
Dept: CT IMAGING | Age: 44
End: 2019-09-10
Attending: STUDENT IN AN ORGANIZED HEALTH CARE EDUCATION/TRAINING PROGRAM
Payer: COMMERCIAL

## 2019-09-10 VITALS
HEART RATE: 82 BPM | SYSTOLIC BLOOD PRESSURE: 102 MMHG | BODY MASS INDEX: 26.58 KG/M2 | RESPIRATION RATE: 18 BRPM | DIASTOLIC BLOOD PRESSURE: 60 MMHG | WEIGHT: 150 LBS | TEMPERATURE: 98.4 F | HEIGHT: 63 IN | OXYGEN SATURATION: 98 %

## 2019-09-10 DIAGNOSIS — R10.13 RECURRENT EPIGASTRIC ABDOMINAL PAIN: Primary | ICD-10-CM

## 2019-09-10 LAB
ALBUMIN SERPL-MCNC: 3.4 G/DL (ref 3.5–5)
ALBUMIN/GLOB SERPL: 0.9 {RATIO} (ref 1.1–2.2)
ALP SERPL-CCNC: 52 U/L (ref 45–117)
ALT SERPL-CCNC: 28 U/L (ref 12–78)
ANION GAP SERPL CALC-SCNC: 8 MMOL/L (ref 5–15)
APPEARANCE UR: CLEAR
AST SERPL-CCNC: 18 U/L (ref 15–37)
BACTERIA URNS QL MICRO: NEGATIVE /HPF
BASOPHILS # BLD: 0.1 K/UL (ref 0–0.1)
BASOPHILS NFR BLD: 1 % (ref 0–1)
BILIRUB SERPL-MCNC: 0.2 MG/DL (ref 0.2–1)
BILIRUB UR QL: NEGATIVE
BUN SERPL-MCNC: 15 MG/DL (ref 6–20)
BUN/CREAT SERPL: 19 (ref 12–20)
CALCIUM SERPL-MCNC: 8.5 MG/DL (ref 8.5–10.1)
CHLORIDE SERPL-SCNC: 104 MMOL/L (ref 97–108)
CO2 SERPL-SCNC: 26 MMOL/L (ref 21–32)
COLOR UR: ABNORMAL
COMMENT, HOLDF: NORMAL
CREAT SERPL-MCNC: 0.79 MG/DL (ref 0.55–1.02)
DIFFERENTIAL METHOD BLD: ABNORMAL
EOSINOPHIL # BLD: 0.2 K/UL (ref 0–0.4)
EOSINOPHIL NFR BLD: 2 % (ref 0–7)
EPITH CASTS URNS QL MICRO: ABNORMAL /LPF
ERYTHROCYTE [DISTWIDTH] IN BLOOD BY AUTOMATED COUNT: 12.7 % (ref 11.5–14.5)
GLOBULIN SER CALC-MCNC: 3.8 G/DL (ref 2–4)
GLUCOSE SERPL-MCNC: 97 MG/DL (ref 65–100)
GLUCOSE UR STRIP.AUTO-MCNC: NEGATIVE MG/DL
HCG UR QL: NEGATIVE
HCT VFR BLD AUTO: 40.7 % (ref 35–47)
HGB BLD-MCNC: 13.6 G/DL (ref 11.5–16)
HGB UR QL STRIP: ABNORMAL
HYALINE CASTS URNS QL MICRO: ABNORMAL /LPF (ref 0–5)
IMM GRANULOCYTES # BLD AUTO: 0.1 K/UL (ref 0–0.04)
IMM GRANULOCYTES NFR BLD AUTO: 0 % (ref 0–0.5)
KETONES UR QL STRIP.AUTO: NEGATIVE MG/DL
LEUKOCYTE ESTERASE UR QL STRIP.AUTO: NEGATIVE
LIPASE SERPL-CCNC: 278 U/L (ref 73–393)
LYMPHOCYTES # BLD: 1.9 K/UL (ref 0.8–3.5)
LYMPHOCYTES NFR BLD: 16 % (ref 12–49)
MCH RBC QN AUTO: 30.1 PG (ref 26–34)
MCHC RBC AUTO-ENTMCNC: 33.4 G/DL (ref 30–36.5)
MCV RBC AUTO: 90 FL (ref 80–99)
MONOCYTES # BLD: 0.9 K/UL (ref 0–1)
MONOCYTES NFR BLD: 8 % (ref 5–13)
NEUTS SEG # BLD: 8.8 K/UL (ref 1.8–8)
NEUTS SEG NFR BLD: 73 % (ref 32–75)
NITRITE UR QL STRIP.AUTO: NEGATIVE
NRBC # BLD: 0 K/UL (ref 0–0.01)
NRBC BLD-RTO: 0 PER 100 WBC
PH UR STRIP: 6 [PH] (ref 5–8)
PLATELET # BLD AUTO: 361 K/UL (ref 150–400)
PMV BLD AUTO: 9.4 FL (ref 8.9–12.9)
POTASSIUM SERPL-SCNC: 4.1 MMOL/L (ref 3.5–5.1)
PROT SERPL-MCNC: 7.2 G/DL (ref 6.4–8.2)
PROT UR STRIP-MCNC: NEGATIVE MG/DL
RBC # BLD AUTO: 4.52 M/UL (ref 3.8–5.2)
RBC #/AREA URNS HPF: ABNORMAL /HPF (ref 0–5)
SAMPLES BEING HELD,HOLD: NORMAL
SODIUM SERPL-SCNC: 138 MMOL/L (ref 136–145)
SP GR UR REFRACTOMETRY: 1.01 (ref 1–1.03)
UR CULT HOLD, URHOLD: NORMAL
UROBILINOGEN UR QL STRIP.AUTO: 0.2 EU/DL (ref 0.2–1)
WBC # BLD AUTO: 11.9 K/UL (ref 3.6–11)
WBC URNS QL MICRO: ABNORMAL /HPF (ref 0–4)

## 2019-09-10 PROCEDURE — 85025 COMPLETE CBC W/AUTO DIFF WBC: CPT

## 2019-09-10 PROCEDURE — 99282 EMERGENCY DEPT VISIT SF MDM: CPT

## 2019-09-10 PROCEDURE — 81025 URINE PREGNANCY TEST: CPT

## 2019-09-10 PROCEDURE — 74011250636 HC RX REV CODE- 250/636: Performed by: STUDENT IN AN ORGANIZED HEALTH CARE EDUCATION/TRAINING PROGRAM

## 2019-09-10 PROCEDURE — 81001 URINALYSIS AUTO W/SCOPE: CPT

## 2019-09-10 PROCEDURE — 96375 TX/PRO/DX INJ NEW DRUG ADDON: CPT

## 2019-09-10 PROCEDURE — 96374 THER/PROPH/DIAG INJ IV PUSH: CPT

## 2019-09-10 PROCEDURE — 80053 COMPREHEN METABOLIC PANEL: CPT

## 2019-09-10 PROCEDURE — 83690 ASSAY OF LIPASE: CPT

## 2019-09-10 PROCEDURE — 74011636320 HC RX REV CODE- 636/320: Performed by: RADIOLOGY

## 2019-09-10 PROCEDURE — 74177 CT ABD & PELVIS W/CONTRAST: CPT

## 2019-09-10 PROCEDURE — 36415 COLL VENOUS BLD VENIPUNCTURE: CPT

## 2019-09-10 RX ORDER — KETOROLAC TROMETHAMINE 30 MG/ML
15 INJECTION, SOLUTION INTRAMUSCULAR; INTRAVENOUS
Status: COMPLETED | OUTPATIENT
Start: 2019-09-10 | End: 2019-09-10

## 2019-09-10 RX ORDER — DIPHENHYDRAMINE HYDROCHLORIDE 50 MG/ML
25 INJECTION, SOLUTION INTRAMUSCULAR; INTRAVENOUS
Status: COMPLETED | OUTPATIENT
Start: 2019-09-10 | End: 2019-09-10

## 2019-09-10 RX ORDER — PROCHLORPERAZINE MALEATE 10 MG
10 TABLET ORAL
Qty: 12 TAB | Refills: 0 | Status: SHIPPED | OUTPATIENT
Start: 2019-09-10 | End: 2019-09-17

## 2019-09-10 RX ORDER — PROCHLORPERAZINE EDISYLATE 5 MG/ML
10 INJECTION INTRAMUSCULAR; INTRAVENOUS
Status: COMPLETED | OUTPATIENT
Start: 2019-09-10 | End: 2019-09-10

## 2019-09-10 RX ORDER — MORPHINE SULFATE 4 MG/ML
4 INJECTION INTRAVENOUS ONCE
Status: COMPLETED | OUTPATIENT
Start: 2019-09-10 | End: 2019-09-10

## 2019-09-10 RX ORDER — ONDANSETRON 2 MG/ML
4 INJECTION INTRAMUSCULAR; INTRAVENOUS
Status: DISCONTINUED | OUTPATIENT
Start: 2019-09-10 | End: 2019-09-10

## 2019-09-10 RX ADMIN — KETOROLAC TROMETHAMINE 15 MG: 30 INJECTION, SOLUTION INTRAMUSCULAR at 03:12

## 2019-09-10 RX ADMIN — SODIUM CHLORIDE 1000 ML: 900 INJECTION, SOLUTION INTRAVENOUS at 03:13

## 2019-09-10 RX ADMIN — PROCHLORPERAZINE EDISYLATE 10 MG: 5 INJECTION INTRAMUSCULAR; INTRAVENOUS at 03:12

## 2019-09-10 RX ADMIN — IOPAMIDOL 100 ML: 755 INJECTION, SOLUTION INTRAVENOUS at 04:07

## 2019-09-10 RX ADMIN — DIPHENHYDRAMINE HYDROCHLORIDE 25 MG: 50 INJECTION, SOLUTION INTRAMUSCULAR; INTRAVENOUS at 03:12

## 2019-09-10 RX ADMIN — MORPHINE SULFATE 4 MG: 4 INJECTION, SOLUTION INTRAMUSCULAR; INTRAVENOUS at 03:12

## 2019-09-10 NOTE — ED TRIAGE NOTES
Patient with severe abdominal pain. Has had it in the past and was told that if it happened again to come to the ER. States has taken pain medication without relief. Was going to wait til morning to see PCP but could not wait because of the pain. Has taken 8mg of flexeril, 8mg of zofran, zantac, hysotec, and tylenol, starting at 2200.   Last medication she took was around 5041

## 2019-09-10 NOTE — ED PROVIDER NOTES
57-year-old woman with history of dysmenorrhea, factor V Leiden mutation, migraine headaches, hypercholesterolemia and and as yet undiagnosed abdominal pain condition which has been ongoing for a few years. Followed primarily by her primary care doctor. States that it occurs once a month. Usually happens at the time of ovulation. States her last menstrual period was the third of this month. Denies any associated vomiting, does endorse nausea. No fevers, vaginal bleeding, vaginal discharge, diarrhea, GI bleeding, distention. Denies dysuria or flank pain. States that the pain is constant, occasionally sharp. Last p.o. intake was at 9 PM.  Pain began at 10 PM.  Denies belching, water brash. No hx or symptoms of GERD. Past Medical History:   Diagnosis Date    Dysmenorrhea     Factor V Leiden mutation ([address]     Headache(784.0)     migraines    Hypercholesterolemia     Panic attacks     PVC's        Past Surgical History:   Procedure Laterality Date    HX HEENT      lasik    HX UROLOGICAL      dilation age 7         Family History:   Problem Relation Age of Onset    Other Sister         protein C defic., DVT    Cancer Maternal Grandmother         breast    Cancer Paternal Grandmother         ?        Social History     Socioeconomic History    Marital status: SINGLE     Spouse name: Not on file    Number of children: Not on file    Years of education: Not on file    Highest education level: Not on file   Occupational History    Not on file   Social Needs    Financial resource strain: Not on file    Food insecurity:     Worry: Not on file     Inability: Not on file    Transportation needs:     Medical: Not on file     Non-medical: Not on file   Tobacco Use    Smoking status: Never Smoker    Smokeless tobacco: Never Used   Substance and Sexual Activity    Alcohol use: Yes     Comment: rarely    Drug use: Not on file    Sexual activity: Not on file   Lifestyle    Physical activity:     Days per week: Not on file     Minutes per session: Not on file    Stress: Not on file   Relationships    Social connections:     Talks on phone: Not on file     Gets together: Not on file     Attends [employer] service: Not on file     Active member of club or organization: Not on file     Attends meetings of clubs or organizations: Not on file     Relationship status: Not on file    Intimate partner violence:     Fear of current or ex partner: Not on file     Emotionally abused: Not on file     Physically abused: Not on file     Forced sexual activity: Not on file   Other Topics Concern    Not on file   Social History Narrative    Not on file         ALLERGIES: Amoxicillin; Bactrim [sulfamethoxazole-trimethoprim]; Doxycycline; Sucralose; and Suprax [cefixime]    Review of Systems   Constitutional: Negative for chills, fatigue and fever. Eyes: Negative for photophobia. Respiratory: Negative for shortness of breath. Cardiovascular: Negative for chest pain. Gastrointestinal: Positive for abdominal pain and nausea. Genitourinary: Negative for dysuria. Musculoskeletal: Negative for back pain. Neurological: Negative for headaches. Psychiatric/Behavioral: Negative for confusion. All other systems reviewed and are negative. Vitals:    09/10/19 0236   BP: 125/82   Pulse: 82   Resp: 18   Temp: 98.4 °F (36.9 °C)   SpO2: 98%   Weight: 68 kg (150 lb)   Height: 5' 3\" (1.6 m)            Physical Exam   Constitutional: She appears well-developed and well-nourished. No distress. HENT:   Head: Normocephalic. Eyes: Pupils are equal, round, and reactive to light. Cardiovascular: Normal rate, regular rhythm and intact distal pulses. Pulmonary/Chest: Effort normal.   Abdominal: Normal appearance. She exhibits no distension. There is tenderness in the right upper quadrant, epigastric area and left upper quadrant. There is no rebound, no guarding and no CVA tenderness.    Genitourinary: Vagina normal.   Neurological: She is alert. Skin: Skin is warm. Capillary refill takes less than 2 seconds. Psychiatric: Her behavior is normal.        MDM  Number of Diagnoses or Management Options  Recurrent epigastric abdominal pain:   Diagnosis management comments: Pinky Bah is a 45 y.o. female presenting with recurrent abdominal pain in stereotypical location but increased severity. Quality is also similar to previous. Unclear etiology. Being followed by primary care and OB/GYN. In fact she has a pelvic ultrasound scheduled for later today. Has not needed to come to the ER in the past.  States that her primary care physician recommended that in case she had a severe flare with this pain after usual business hours that she should go to the ED. [facility] reviewed, had 2 prescribers for all of her controlled substances-psychiatrist and primary care physician. Differential includes atypical presentation of endometriosis, cyclical vomiting syndrome, gastroparesis, mittelschmerz, abdominal migraine, gastritis, GERD, pancreatitis, enteritis, food poisoning, less likely perforated viscus, appendicitis, diverticulitis amongst others. Also consider inflammatory bowel disease. Course: Check CT abdomen pelvis, labs, urinalysis, symptom control with migraine agents, pain medication, will reassess. _______________________    CT is unremarkable. Patient symptoms have completely resolved. Plan to follow-up with primary care, may need specialist referral if continues despite treatment.   Given a prescription for Compazine for possible abdominal migraines, she responded very well to IV Compazine in the emergency department.  5:09 AM  Dispo: azra.           Procedures

## 2019-09-10 NOTE — DISCHARGE INSTRUCTIONS
Follow-up closely with your physician. You pain may be due to a chronic condition such as gastroparesis or abdominal migraines. Take the prescribed medication with 25 mg of Benadryl to reduce side effects. If you have new or concerning symptoms return to the emergency department. Ferritin detailed below: How can you care for yourself at home? Rest until you feel better. To prevent dehydration, drink plenty of fluids, enough so that your urine is light yellow or clear like water. Choose water and other caffeine-free clear liquids until you feel better. If you have kidney, heart, or liver disease and have to limit fluids, talk with your doctor before you increase the amount of fluids you drink. If your stomach is upset, eat mild foods, such as rice, dry toast or crackers, bananas, and applesauce. Try eating several small meals instead of two or three large ones. Wait until 48 hours after all symptoms have gone away before you have spicy foods, alcohol, and drinks that contain caffeine. Do not eat foods that are high in fat. Avoid anti-inflammatory medicines such as aspirin, ibuprofen (Advil, Motrin), and naproxen (Aleve). These can cause stomach upset. Talk to your doctor if you take daily aspirin for another health problem. When should you call for help? Call 911 anytime you think you may need emergency care. For example, call if:    You passed out (lost consciousness). You pass maroon or very bloody stools. You vomit blood or what looks like coffee grounds. You have new, severe belly pain. Call your doctor now or seek immediate medical care if:    Your pain gets worse, especially if it becomes focused in one area of your belly. You have a new or higher fever. Your stools are black and look like tar, or they have streaks of blood. You have unexpected vaginal bleeding. You have symptoms of a urinary tract infection. These may include:  Pain when you urinate.   Urinating more often than usual.  Blood in your urine. You are dizzy or lightheaded, or you feel like you may faint. Watch closely for changes in your health, and be sure to contact your doctor if:    You are not getting better after 1 day (24 hours).

## 2019-09-14 RX ORDER — ONDANSETRON 4 MG/1
TABLET, ORALLY DISINTEGRATING ORAL
Qty: 30 TAB | Refills: 0 | Status: SHIPPED | OUTPATIENT
Start: 2019-09-14 | End: 2019-12-27

## 2019-09-28 DIAGNOSIS — N94.6 SEVERE MENSTRUAL CRAMPS: Primary | ICD-10-CM

## 2019-10-02 ENCOUNTER — TELEPHONE (OUTPATIENT)
Dept: INTERNAL MEDICINE CLINIC | Age: 44
End: 2019-10-02

## 2019-10-02 RX ORDER — ACETAMINOPHEN AND CODEINE PHOSPHATE 300; 30 MG/1; MG/1
TABLET ORAL
Qty: 30 TAB | Refills: 0 | OUTPATIENT
Start: 2019-10-02 | End: 2019-11-22

## 2019-10-02 NOTE — TELEPHONE ENCOUNTER
Received VRBO from Dr Maryanne Wynn to phone in Tylenol # 3,  30 tab/0 refill. LM at local pharmacy.

## 2019-10-27 RX ORDER — CYCLOBENZAPRINE HCL 5 MG
TABLET ORAL
Qty: 30 TAB | Refills: 0 | Status: SHIPPED | OUTPATIENT
Start: 2019-10-27 | End: 2019-11-27 | Stop reason: SDUPTHER

## 2019-10-29 ENCOUNTER — HOSPITAL ENCOUNTER (OUTPATIENT)
Dept: NUCLEAR MEDICINE | Age: 44
Discharge: HOME | End: 2019-10-29
Attending: INTERNAL MEDICINE
Payer: COMMERCIAL

## 2019-10-29 VITALS — WEIGHT: 155 LBS | BODY MASS INDEX: 27.46 KG/M2

## 2019-10-29 DIAGNOSIS — K58.9 IRRITABLE BOWEL SYNDROME: ICD-10-CM

## 2019-10-29 DIAGNOSIS — R10.84 GENERALIZED ABDOMINAL PAIN: ICD-10-CM

## 2019-10-29 DIAGNOSIS — D68.51 FACTOR V LEIDEN MUTATION (HCC): ICD-10-CM

## 2019-10-29 PROCEDURE — 78227 HEPATOBIL SYST IMAGE W/DRUG: CPT

## 2019-10-29 PROCEDURE — 74011250636 HC RX REV CODE- 250/636: Performed by: INTERNAL MEDICINE

## 2019-10-29 PROCEDURE — 74011000258 HC RX REV CODE- 258: Performed by: INTERNAL MEDICINE

## 2019-10-29 RX ORDER — SODIUM CHLORIDE 9 MG/ML
25 INJECTION, SOLUTION INTRAVENOUS
Status: COMPLETED | OUTPATIENT
Start: 2019-10-29 | End: 2019-10-29

## 2019-10-29 RX ORDER — SODIUM CHLORIDE 0.9 % (FLUSH) 0.9 %
10 SYRINGE (ML) INJECTION
Status: COMPLETED | OUTPATIENT
Start: 2019-10-29 | End: 2019-10-29

## 2019-10-29 RX ADMIN — SODIUM CHLORIDE 25 ML: 900 INJECTION, SOLUTION INTRAVENOUS at 14:00

## 2019-10-29 RX ADMIN — SINCALIDE 1.41 MCG: 5 INJECTION, POWDER, LYOPHILIZED, FOR SOLUTION INTRAVENOUS at 14:00

## 2019-10-29 RX ADMIN — Medication 10 ML: at 12:58

## 2019-11-04 ENCOUNTER — DOCUMENTATION ONLY (OUTPATIENT)
Dept: SURGERY | Age: 44
End: 2019-11-04

## 2019-11-04 NOTE — PROGRESS NOTES
Received office notes from [facility]. Message sent to [facility] to schedule patient; notes sent to Dr. Eleuterio Jones for review.

## 2019-11-05 ENCOUNTER — OFFICE VISIT (OUTPATIENT)
Dept: INTERNAL MEDICINE CLINIC | Age: 44
End: 2019-11-05

## 2019-11-05 VITALS
RESPIRATION RATE: 16 BRPM | DIASTOLIC BLOOD PRESSURE: 74 MMHG | BODY MASS INDEX: 28.1 KG/M2 | TEMPERATURE: 98 F | SYSTOLIC BLOOD PRESSURE: 107 MMHG | WEIGHT: 158.6 LBS | HEART RATE: 91 BPM | HEIGHT: 63 IN | OXYGEN SATURATION: 97 %

## 2019-11-05 DIAGNOSIS — R10.10 UPPER ABDOMINAL PAIN: Primary | ICD-10-CM

## 2019-11-05 DIAGNOSIS — K82.8 BILIARY DYSKINESIA: ICD-10-CM

## 2019-11-05 RX ORDER — ASPIRIN 81 MG/1
81 TABLET ORAL DAILY
COMMUNITY

## 2019-11-05 RX ORDER — DICYCLOMINE HYDROCHLORIDE 20 MG/1
20 TABLET ORAL
COMMUNITY
Start: 2019-10-18 | End: 2021-04-16 | Stop reason: SDUPTHER

## 2019-11-05 NOTE — PROGRESS NOTES
1. Have you been to the ER, urgent care clinic since your last visit? Hospitalized since your last visit? Yes Where: ER  Reason for visit: Abdominal Pain    2. Have you seen or consulted any other health care providers outside of the [address] [place] [address] since your last visit? Include any pap smears or colon screening.  No

## 2019-11-10 NOTE — PROGRESS NOTES
HISTORY OF PRESENT ILLNESS  Kris Baeza is a 45 y.o. female. HPI Subjective:  Rene Hooper is seen today for evaluation of biliary dyskinesia. She is accompanied by her mom. I have reviewed the available GI notes, as well as imaging reports in [facility]. The diagnosis seems very clear and her symptoms are suspicious. She has been referred to Dr. Maria Esther Enriquez of surgery. Reviewed medication treatments. She has been given Bentyl, which helps with spasm-like pain, but not her biliary pain. We counseled for 15 minutes regarding the diagnosis of biliary dyskinesia. Reviewed with her that surgery appears to be indicated to me based on symptoms and test results. Apparently there is confirmation of her gastro specialist, Dr. Jersey Hennessy, regarding this as well. 15 minute visit overall, greater than 50% of the visit was spent in counseling. Review of Systems   Gastrointestinal: Positive for abdominal pain. Negative for nausea and vomiting. Physical Exam   Constitutional: No distress. Nursing note and vitals reviewed. ASSESSMENT and PLAN  Diagnoses and all orders for this visit:    1. Upper abdominal pain- Proceed with plan as discussed     2. Biliary dyskinesia- See specialists  as directed.      Plan was reviewed with patient and family, understanding expressed

## 2019-11-14 ENCOUNTER — OFFICE VISIT (OUTPATIENT)
Dept: SURGERY | Age: 44
End: 2019-11-14

## 2019-11-14 VITALS
SYSTOLIC BLOOD PRESSURE: 117 MMHG | TEMPERATURE: 98.5 F | WEIGHT: 158.2 LBS | DIASTOLIC BLOOD PRESSURE: 81 MMHG | BODY MASS INDEX: 28.03 KG/M2 | OXYGEN SATURATION: 98 % | HEIGHT: 63 IN | RESPIRATION RATE: 18 BRPM | HEART RATE: 90 BPM

## 2019-11-14 DIAGNOSIS — K82.8 BILIARY DYSKINESIA: Primary | ICD-10-CM

## 2019-11-14 NOTE — PROGRESS NOTES
1. Have you been to the ER, urgent care clinic since your last visit? Hospitalized since your last visit? No    2. Have you seen or consulted any other health care providers outside of the [address] [place] [address] since your last visit? Include any pap smears or colon screening.  No

## 2019-11-14 NOTE — LETTER
11/20/19 Patient: Kike Vargas YOB: 1975 Date of Visit: 11/14/2019 Precious Rios MD 
[facility] [address] [place] [address] [id number] VIA In Basket Dear Precious Rios MD, Thank you for referring Ms. Kike Vargas to [facility] for evaluation. My notes for this consultation are attached. If you have questions, please do not hesitate to call me. I look forward to following your patient along with you.  
 
 
Sincerely, 
 
Viridiana Beasley MD

## 2019-11-19 ENCOUNTER — TELEPHONE (OUTPATIENT)
Dept: SURGERY | Age: 44
End: 2019-11-19

## 2019-11-19 NOTE — TELEPHONE ENCOUNTER
Contacted patient with date of surgery : 12-4-19. She mentioned that she has information from her hematologist regarding recommendations regarding a blood clotting disorder. Please call her for instructions.

## 2019-11-19 NOTE — TELEPHONE ENCOUNTER
Patient called stating she would like to speak with nurse regarding her post op instructions from her PCP.

## 2019-11-19 NOTE — TELEPHONE ENCOUNTER
Returned call to patient. Two patient identifiers used. Per patient she spoke to her hematologists office (Dr. Eula Canseco) and was told she should d/c the asa 3-5 days prior to surgery, lovenox bridge immediately after surgery; and finally patient should have diaz hose post op until she is active, however, he was would defer to  [facility]. I explained to patient I would let  [facility] know.

## 2019-11-20 PROBLEM — K82.8 BILIARY DYSKINESIA: Status: ACTIVE | Noted: 2019-11-20

## 2019-11-20 NOTE — PROGRESS NOTES
[address] [place] [address] [facility] History and Physical    Chief Complaint: biliary dyskinesia    History of Present Illness:      Frank Hopper is a 45 y.o. female who was kindly referred by Dr. Soni Balderas for evaluation of biliary dyskinesia. The patient states she has had on and off abdominal pain for years. This is mostly epigastric but can be bilateral.  She has had increased frequency and severity recently. It is happening at least once per month now and has required some ER visits. She has some nausea but no vomiting. This is not always clearly triggered by food. She has had a thorough GI workup by Dr. Eligio Pritchard, but HIDA scan was performed that showed a significantly reduced EF of 7%. She reports she has Factor V leiden mutation and is followed for this by Dr. Booker Joel.       Past Medical History:   Diagnosis Date    Dysmenorrhea     Factor V Leiden mutation ([address]     Headache(784.0)     migraines    Hypercholesterolemia     Panic attacks     PVC's        Past Surgical History:   Procedure Laterality Date    HX HEENT      lasik    HX UROLOGICAL      dilation age 7       Social History     Socioeconomic History    Marital status: SINGLE     Spouse name: Not on file    Number of children: Not on file    Years of education: Not on file    Highest education level: Not on file   Occupational History    Not on file   Social Needs    Financial resource strain: Not on file    Food insecurity:     Worry: Not on file     Inability: Not on file    Transportation needs:     Medical: Not on file     Non-medical: Not on file   Tobacco Use    Smoking status: Never Smoker    Smokeless tobacco: Never Used   Substance and Sexual Activity    Alcohol use: Yes     Comment: rarely    Drug use: Not on file    Sexual activity: Yes     Birth control/protection: Condom   Lifestyle    Physical activity:     Days per week: Not on file     Minutes per session: Not on file    Stress: Not on file   Relationships  Social connections:     Talks on phone: Not on file     Gets together: Not on file     Attends Zoroastrianism service: Not on file     Active member of club or organization: Not on file     Attends meetings of clubs or organizations: Not on file     Relationship status: Not on file    Intimate partner violence:     Fear of current or ex partner: Not on file     Emotionally abused: Not on file     Physically abused: Not on file     Forced sexual activity: Not on file   Other Topics Concern    Not on file   Social History Narrative    Not on file       Family History   Problem Relation Age of Onset    Other Sister         protein C defic., DVT    Cancer Maternal Grandmother         breast    Depression Maternal Grandmother     Hypertension Maternal Grandmother     Cancer Paternal Grandmother         ?  Diabetes Paternal Grandmother     Heart Disease Maternal Grandfather     Stroke Maternal Grandfather          Current Outpatient Medications:     aspirin delayed-release 81 mg tablet, Take 81 mg by mouth daily. , Disp: , Rfl:     dicyclomine (BENTYL) 20 mg tablet, Take 20 mg by mouth every six (6) hours. , Disp: , Rfl:     cyclobenzaprine (FLEXERIL) 5 mg tablet, TAKE ONE TABLET BY MOUTH THREE TIMES A DAY AS NEEDED FOR MUSCLE SPASMS (MENSTRUAL CRAMPS), Disp: 30 Tab, Rfl: 0    ondansetron (ZOFRAN ODT) 4 mg disintegrating tablet, DISSOLVE ONE TABLET BY MOUTH EVERY 8 HOURS AS NEEDED FOR NAUSEA, Disp: 30 Tab, Rfl: 0    OTHER, Indications: CBD Oil, Disp: , Rfl:     omeprazole (PRILOSEC) 20 mg capsule, TAKE ONE CAPSULE BY MOUTH TWICE A DAY, Disp: 60 Cap, Rfl: 7    acetaminophen-codeine (TYLENOL #3) 300-30 mg per tablet, TAKE ONE TO TWO TABLETS BY MOUTH EVERY 6 HOURS AS NEEDED FOR PAIN, Disp: 30 Tab, Rfl: 0    codeine-butalbital-acetaminophen-caffeine (FIORICET WITH CODEINE) -62-30 mg capsule, TAKE ONE CAPSULE BY MOUTH THREE TIMES A DAY AS NEEDED  FOR HEADACHE OR MUSCLE SPASMS, Disp: 30 Cap, Rfl: 0   BYSTOLIC 5 mg tablet, MARLO 1/2 TABLET BY MOUTH ONCE DAILY, Disp: 45 Tab, Rfl: 3    hyoscyamine SL (LEVSIN/SL) 0.125 mg SL tablet, DISSOLVE ONE TABLET UNDER THE TONGUE EVERY 6 HOURS AS NEEDED FOR CRAMPING, Disp: 30 Tab, Rfl: 10    clonazePAM (KLONOPIN) 1 mg tablet, Take 1 mg by mouth three (3) times daily. , Disp: , Rfl:     azelaic acid (FINACEA) 15 % topical gel, Apply  to affected area two (2) times a day., Disp: , Rfl:     venlafaxine-SR (EFFEXOR XR) 150 mg capsule, Take 75 mg by mouth every morning. Take 3 tabs daily.  Brand Name., Disp: , Rfl:     acetaminophen-codeine (TYLENOL #3) 300-30 mg per tablet, TAKE ONE TO TWO TABLETS BY MOUTH EVERY 6 HOURS AS NEEDED FOR PAIN, Disp: 30 Tab, Rfl: 0    acetaminophen-codeine (TYLENOL #3) 300-30 mg per tablet, TAKE ONE TO TWO TABLETS BY MOUTH EVERY 6 HOURS AS NEEDED FOR PAIN, Disp: 30 Tab, Rfl: 0    codeine-butalbital-acetaminophen-caffeine (FIORICET WITH CODEINE) -85-30 mg capsule, TAKE ONE CAPSULE BY MOUTH THREE TIMES A DAY AS NEEDED FOR HEADACHE OR MUSCLE SPASM, Disp: 30 Cap, Rfl: 0    acetaminophen-codeine (TYLENOL #3) 300-30 mg per tablet, TAKE ONE TO TWO TABLETS BY MOUTH EVERY 6 HOURS AS NEEDED FOR PAIN, Disp: 30 Tab, Rfl: 0    Allergies   Allergen Reactions    Amoxicillin Swelling     Swelling of hands and feet and rash     Bactrim [Sulfamethoxazole-Trimethoprim] Swelling and Myalgia    Doxycycline Other (comments)     insomnia    Sucralose Other (comments)     Abdominal cramping    Suprax [Cefixime] Swelling and Myalgia       ROS   Constitutional: negative  Ears, Nose, Mouth, Throat, and Face: negative  Respiratory: negative  Cardiovascular: negative  Gastrointestinal: See HPI  Genitourinary:negative  Integument/Breast: negative  Hematologic/Lymphatic: Factor V leiden  Behavioral/Psychiatric: negative  Allergic/Immunologic: negative      Physical Exam:     Visit Vitals  /81 (BP 1 Location: Left arm, BP Patient Position: Sitting)   Pulse 90 Temp 98.5 °F (36.9 °C) (Oral)   Resp 18   Ht 5' 3\" (1.6 m)   Wt 158 lb 3.2 oz (71.8 kg)   LMP 10/25/2019   SpO2 98%   BMI 28.02 kg/m²       General - alert and oriented, no apparent distress  HEENT - NC/AT. No scleral icterus  Pulm - CTAB, normal inspiratory effort  CV - RRR, no M/R/G  Abd - soft, NT, ND. No palpable masses or organomegaly. Ext - warm, well perfused, no edema  Lymphatics - no cervical, supraclavicular or inguinal adenopathy noted. Skin - supple, no rashes  Psychiatric - normal affect, good mood    Labs  Results for Monika Conley (MRN [id number]) as of 11/20/2019 18:26   Ref. Range 9/10/2019 03:04   WBC Latest Ref Range: 3.6 - 11.0 K/uL 11.9 (H)   NRBC Latest Ref Range: 0  WBC 0.0   RBC Latest Ref Range: 3.80 - 5.20 M/uL 4.52   HGB Latest Ref Range: 11.5 - 16.0 g/dL 13.6   HCT Latest Ref Range: 35.0 - 47.0 % 40.7   MCV Latest Ref Range: 80.0 - 99.0 FL 90.0   MCH Latest Ref Range: 26.0 - 34.0 PG 30.1   MCHC Latest Ref Range: 30.0 - 36.5 g/dL 33.4   RDW Latest Ref Range: 11.5 - 14.5 % 12.7   PLATELET Latest Ref Range: 150 - 400 K/uL 361     Results for Monika Conley (MRN [id number]) as of 11/20/2019 18:26   Ref.  Range 9/10/2019 03:04   Sodium Latest Ref Range: 136 - 145 mmol/L 138   Potassium Latest Ref Range: 3.5 - 5.1 mmol/L 4.1   Chloride Latest Ref Range: 97 - 108 mmol/L 104   CO2 Latest Ref Range: 21 - 32 mmol/L 26   Anion gap Latest Ref Range: 5 - 15 mmol/L 8   Glucose Latest Ref Range: 65 - 100 mg/dL 97   BUN Latest Ref Range: 6 - 20 MG/DL 15   Creatinine Latest Ref Range: 0.55 - 1.02 MG/DL 0.79   BUN/Creatinine ratio Latest Ref Range: 12 - 20   19   Calcium Latest Ref Range: 8.5 - 10.1 MG/DL 8.5   GFR est non-AA Latest Ref Range: >60 ml/min/1.73m2 >60   GFR est AA Latest Ref Range: >60 ml/min/1.73m2 >60   Bilirubin, total Latest Ref Range: 0.2 - 1.0 MG/DL 0.2   Protein, total Latest Ref Range: 6.4 - 8.2 g/dL 7.2   Albumin Latest Ref Range: 3.5 - 5.0 g/dL 3.4 (L)   Globulin Latest Ref Range: 2.0 - 4.0 g/dL 3.8   A-G Ratio Latest Ref Range: 1.1 - 2.2   0.9 (L)   ALT (SGPT) Latest Ref Range: 12 - 78 U/L 28   AST Latest Ref Range: 15 - 37 U/L 18   Alk. phosphatase Latest Ref Range: 45 - 117 U/L 52   Lipase Latest Ref Range: 73 - 393 U/L 278     Imaging  9/10/19 CT A/P:   FINDINGS:   LUNG BASES: Clear. INCIDENTALLY IMAGED HEART AND MEDIASTINUM: Unremarkable. LIVER: No mass or biliary dilatation. GALLBLADDER: Unremarkable. SPLEEN: No mass. PANCREAS: No mass or ductal dilatation. ADRENALS: Unremarkable. KIDNEYS: No mass, calculus, or hydronephrosis. STOMACH: Unremarkable. SMALL BOWEL: No dilatation or wall thickening. COLON: No dilatation or wall thickening. APPENDIX: Unremarkable. PERITONEUM: No ascites or pneumoperitoneum. RETROPERITONEUM: No lymphadenopathy or aortic aneurysm. REPRODUCTIVE ORGANS: 1.7 cm right ovarian cyst  URINARY BLADDER: No mass or calculus. BONES: No destructive bone lesion. ADDITIONAL COMMENTS: N/A     IMPRESSION  IMPRESSION:  Small right ovarian cyst    10/29/19 HIDA: FINDINGS:  The initial images demonstrate prompt hepatic tracer uptake. The common bile  duct is visualized at 12 minutes. The gallbladder is visualized at 16 minutes. It demonstrates progressive filling. The duodenum is visualized at 24 minutes.     Post CCK images demonstrate appropriate gallbladder contraction. Calculated  ejection fraction between 0 and 30 minutes is 7 %.     IMPRESSION  IMPRESSION: Biliary dyskinesia. I have reviewed and agree with all of the pertinent images    Assessment:     Helen Hill is a 45 y.o. female with biliary dyskinesia. Recommendations:     1. I have recommended a laparoscopic cholecystectomy. A complete discussion of the risks, benefits and alternatives to surgery were discussed with the patient who was keen to proceed. I have asked her to discuss anticoagulation recommendations with Dr. Juan Chavis for a plan around her surgery.   She will plan to do this prior to surgery. 40 mins of time was spent with the patient of which > 50% of the time involved face-to-face counseling of the patient regarding the proposed treatment plan.       Sweta Jackson MD  11/14/2019    CC: MD Dr. Rona Sosa Dr.

## 2019-11-22 ENCOUNTER — TELEPHONE (OUTPATIENT)
Dept: SURGERY | Age: 44
End: 2019-11-22

## 2019-11-22 ENCOUNTER — HOSPITAL ENCOUNTER (OUTPATIENT)
Dept: PREADMISSION TESTING | Age: 44
Discharge: HOME | End: 2019-11-22
Payer: COMMERCIAL

## 2019-11-22 ENCOUNTER — HOSPITAL ENCOUNTER (OUTPATIENT)
Dept: GENERAL RADIOLOGY | Age: 44
Discharge: HOME | End: 2019-11-22
Attending: SURGERY
Payer: COMMERCIAL

## 2019-11-22 VITALS
WEIGHT: 161 LBS | BODY MASS INDEX: 28.53 KG/M2 | HEART RATE: 83 BPM | SYSTOLIC BLOOD PRESSURE: 110 MMHG | DIASTOLIC BLOOD PRESSURE: 72 MMHG | HEIGHT: 63 IN | TEMPERATURE: 98 F

## 2019-11-22 LAB
ALBUMIN SERPL-MCNC: 3.7 G/DL (ref 3.5–5)
ALBUMIN/GLOB SERPL: 1.1 {RATIO} (ref 1.1–2.2)
ALP SERPL-CCNC: 54 U/L (ref 45–117)
ALT SERPL-CCNC: 35 U/L (ref 12–78)
ANION GAP SERPL CALC-SCNC: 4 MMOL/L (ref 5–15)
APTT PPP: 26.5 SEC (ref 22.1–32)
AST SERPL-CCNC: 19 U/L (ref 15–37)
ATRIAL RATE: 69 BPM
BILIRUB SERPL-MCNC: 0.3 MG/DL (ref 0.2–1)
BUN SERPL-MCNC: 13 MG/DL (ref 6–20)
BUN/CREAT SERPL: 16 (ref 12–20)
CALCIUM SERPL-MCNC: 9.1 MG/DL (ref 8.5–10.1)
CALCULATED P AXIS, ECG09: 38 DEGREES
CALCULATED R AXIS, ECG10: 52 DEGREES
CALCULATED T AXIS, ECG11: 55 DEGREES
CHLORIDE SERPL-SCNC: 104 MMOL/L (ref 97–108)
CO2 SERPL-SCNC: 30 MMOL/L (ref 21–32)
CREAT SERPL-MCNC: 0.8 MG/DL (ref 0.55–1.02)
DIAGNOSIS, 93000: NORMAL
ERYTHROCYTE [DISTWIDTH] IN BLOOD BY AUTOMATED COUNT: 13 % (ref 11.5–14.5)
GLOBULIN SER CALC-MCNC: 3.4 G/DL (ref 2–4)
GLUCOSE SERPL-MCNC: 91 MG/DL (ref 65–100)
HCT VFR BLD AUTO: 39.6 % (ref 35–47)
HGB BLD-MCNC: 12.6 G/DL (ref 11.5–16)
INR PPP: 1 (ref 0.9–1.1)
MCH RBC QN AUTO: 30 PG (ref 26–34)
MCHC RBC AUTO-ENTMCNC: 31.8 G/DL (ref 30–36.5)
MCV RBC AUTO: 94.3 FL (ref 80–99)
NRBC # BLD: 0 K/UL (ref 0–0.01)
NRBC BLD-RTO: 0 PER 100 WBC
P-R INTERVAL, ECG05: 134 MS
PLATELET # BLD AUTO: 371 K/UL (ref 150–400)
PMV BLD AUTO: 10 FL (ref 8.9–12.9)
POTASSIUM SERPL-SCNC: 4.4 MMOL/L (ref 3.5–5.1)
PROT SERPL-MCNC: 7.1 G/DL (ref 6.4–8.2)
PROTHROMBIN TIME: 10.2 SEC (ref 9–11.1)
Q-T INTERVAL, ECG07: 380 MS
QRS DURATION, ECG06: 72 MS
QTC CALCULATION (BEZET), ECG08: 407 MS
RBC # BLD AUTO: 4.2 M/UL (ref 3.8–5.2)
SODIUM SERPL-SCNC: 138 MMOL/L (ref 136–145)
THERAPEUTIC RANGE,PTTT: NORMAL SECS (ref 58–77)
VENTRICULAR RATE, ECG03: 69 BPM
WBC # BLD AUTO: 5 K/UL (ref 3.6–11)

## 2019-11-22 PROCEDURE — 80053 COMPREHEN METABOLIC PANEL: CPT

## 2019-11-22 PROCEDURE — 71046 X-RAY EXAM CHEST 2 VIEWS: CPT

## 2019-11-22 PROCEDURE — 93005 ELECTROCARDIOGRAM TRACING: CPT

## 2019-11-22 PROCEDURE — 36415 COLL VENOUS BLD VENIPUNCTURE: CPT

## 2019-11-22 PROCEDURE — 85730 THROMBOPLASTIN TIME PARTIAL: CPT

## 2019-11-22 PROCEDURE — 85610 PROTHROMBIN TIME: CPT

## 2019-11-22 PROCEDURE — 85027 COMPLETE CBC AUTOMATED: CPT

## 2019-11-22 RX ORDER — CLONAZEPAM 1 MG/1
1 TABLET ORAL 3 TIMES DAILY
COMMUNITY

## 2019-11-22 RX ORDER — OMEPRAZOLE 20 MG/1
20 CAPSULE, DELAYED RELEASE ORAL
COMMUNITY
End: 2020-06-08

## 2019-11-22 RX ORDER — VENLAFAXINE 75 MG/1
75 TABLET ORAL
COMMUNITY

## 2019-11-22 NOTE — TELEPHONE ENCOUNTER
Please call Willie Glorialing back from Pre Admission testing to get a EKG Order for pt. Pt is at the office currently and needs it asap.

## 2019-11-24 DIAGNOSIS — N94.6 SEVERE MENSTRUAL CRAMPS: ICD-10-CM

## 2019-11-25 ENCOUNTER — TELEPHONE (OUTPATIENT)
Dept: SURGERY | Age: 44
End: 2019-11-25

## 2019-11-25 RX ORDER — BUTALBITAL, ACETAMINOPHEN, CAFFEINE AND CODEINE PHOSPHATE 50; 325; 40; 30 MG/1; MG/1; MG/1; MG/1
1 CAPSULE ORAL
Qty: 30 CAP | Refills: 0 | Status: SHIPPED | OUTPATIENT
Start: 2019-11-25 | End: 2020-03-14

## 2019-11-25 NOTE — TELEPHONE ENCOUNTER
Kesha with Pre admission testing called stating she would like to speak with nurse regarding the patient's lab results.

## 2019-11-25 NOTE — TELEPHONE ENCOUNTER
Patient called stating she needs to know today when she's suppose to stop taking her aspirin for before surgery. Also. Patient stated she would like to know how long she would be on lovanox after her surgery.

## 2019-11-25 NOTE — PERIOP NOTES
MARYELLEN IN DR. Adna Cruz OFFICE NOTIFIED REGARDING BLOOD SPECIMEN HEMOLYZED AND THE POSSIBILITY OF POTASSIUM AND AST LEVELS BEING AFFECTED (ALTHOUGH BOTH WERE WITHIN NORMAL RANGE). I ASKED IF DR. MEDINA WOULD WANT THE PATIENT TO RETURN TO [facility] AND HAVE THOSE LABS RE-DRAWN. AWAITING RETURN CALL WITH HOW TO PROCEED.

## 2019-11-26 ENCOUNTER — TELEPHONE (OUTPATIENT)
Dept: SURGERY | Age: 44
End: 2019-11-26

## 2019-11-26 NOTE — PERIOP NOTES
CC MSG SENT TO EMERY DOYLE LPN DR [facility] NURSE REGARDING THE POTASSIUM AND AST LEVELS DRAWN ON 11/22 IN PAT. PLEASE SEE MSG SENT VIA CC. LAB RESULTS RETURNED ON 11/25/19 FROM PAT APPT ON 11/22/19 WERE HEMOLYZED,   K+ AND AST RESULTS MAY BE AFFECTED PER THE LAB MESSAGE. K+: 4.4  AST: 19    PLEASE ADVISE, WOULD DR MEDINA LIKE THESE LABS REPEATED ON DOS?     Jud Sebastian RN  [facility] PAT  [phone]

## 2019-11-26 NOTE — TELEPHONE ENCOUNTER
Returned call to patient. Two patient identifiers used. Made patient aware per Dr. Priscilla Patel she is to stop the asa 5 days prior to surgery. She understands that the last dose should be on 11/28/2019. Patient expressed understanding.

## 2019-11-27 DIAGNOSIS — N94.6 SEVERE MENSTRUAL CRAMPS: ICD-10-CM

## 2019-11-27 RX ORDER — CYCLOBENZAPRINE HCL 5 MG
TABLET ORAL
Qty: 30 TAB | Refills: 0 | Status: SHIPPED | OUTPATIENT
Start: 2019-11-27 | End: 2020-02-06

## 2019-11-27 RX ORDER — ACETAMINOPHEN AND CODEINE PHOSPHATE 300; 30 MG/1; MG/1
TABLET ORAL
Qty: 30 TAB | Refills: 0 | Status: SHIPPED | OUTPATIENT
Start: 2019-11-27 | End: 2019-12-04

## 2019-12-03 ENCOUNTER — ANESTHESIA EVENT (OUTPATIENT)
Dept: SURGERY | Age: 44
End: 2019-12-03
Payer: COMMERCIAL

## 2019-12-04 ENCOUNTER — TELEPHONE (OUTPATIENT)
Dept: SURGERY | Age: 44
End: 2019-12-04

## 2019-12-04 ENCOUNTER — ANESTHESIA (OUTPATIENT)
Dept: SURGERY | Age: 44
End: 2019-12-04
Payer: COMMERCIAL

## 2019-12-04 ENCOUNTER — HOSPITAL ENCOUNTER (OUTPATIENT)
Age: 44
Setting detail: OUTPATIENT SURGERY
Discharge: HOME | End: 2019-12-04
Attending: SURGERY | Admitting: SURGERY
Payer: COMMERCIAL

## 2019-12-04 VITALS
WEIGHT: 161 LBS | OXYGEN SATURATION: 98 % | HEART RATE: 84 BPM | RESPIRATION RATE: 16 BRPM | DIASTOLIC BLOOD PRESSURE: 82 MMHG | SYSTOLIC BLOOD PRESSURE: 145 MMHG | BODY MASS INDEX: 28.53 KG/M2 | HEIGHT: 63 IN | TEMPERATURE: 98 F

## 2019-12-04 DIAGNOSIS — K82.8 BILIARY DYSKINESIA: Primary | ICD-10-CM

## 2019-12-04 DIAGNOSIS — D68.51 FACTOR V LEIDEN (HCC): ICD-10-CM

## 2019-12-04 LAB
AST SERPL-CCNC: 20 U/L (ref 15–37)
HCG UR QL: NEGATIVE
POTASSIUM SERPL-SCNC: 3.7 MMOL/L (ref 3.5–5.1)

## 2019-12-04 PROCEDURE — 76010000149 HC OR TIME 1 TO 1.5 HR: Performed by: SURGERY

## 2019-12-04 PROCEDURE — 81025 URINE PREGNANCY TEST: CPT

## 2019-12-04 PROCEDURE — 84132 ASSAY OF SERUM POTASSIUM: CPT

## 2019-12-04 PROCEDURE — 77030040361 HC SLV COMPR DVT MDII -B: Performed by: SURGERY

## 2019-12-04 PROCEDURE — 77030007955 HC PCH ENDOSC SPEC J&J -B: Performed by: SURGERY

## 2019-12-04 PROCEDURE — 77030008608 HC TRCR ENDOSC SMTH AMR -B: Performed by: SURGERY

## 2019-12-04 PROCEDURE — 77030031139 HC SUT VCRL2 J&J -A: Performed by: SURGERY

## 2019-12-04 PROCEDURE — 74011000250 HC RX REV CODE- 250: Performed by: NURSE ANESTHETIST, CERTIFIED REGISTERED

## 2019-12-04 PROCEDURE — 77030011640 HC PAD GRND REM COVD -A: Performed by: SURGERY

## 2019-12-04 PROCEDURE — 77030002933 HC SUT MCRYL J&J -A: Performed by: SURGERY

## 2019-12-04 PROCEDURE — 77030020747 HC TU INSUF ENDOSC TELE -A: Performed by: SURGERY

## 2019-12-04 PROCEDURE — 76210000021 HC REC RM PH II 0.5 TO 1 HR: Performed by: SURGERY

## 2019-12-04 PROCEDURE — 77030008684 HC TU ET CUF COVD -B: Performed by: ANESTHESIOLOGY

## 2019-12-04 PROCEDURE — 74011250637 HC RX REV CODE- 250/637

## 2019-12-04 PROCEDURE — 77030035029 HC NDL INSUF VERES DISP COVD -B: Performed by: SURGERY

## 2019-12-04 PROCEDURE — 74011250636 HC RX REV CODE- 250/636: Performed by: NURSE ANESTHETIST, CERTIFIED REGISTERED

## 2019-12-04 PROCEDURE — 36415 COLL VENOUS BLD VENIPUNCTURE: CPT

## 2019-12-04 PROCEDURE — 74011000258 HC RX REV CODE- 258: Performed by: NURSE ANESTHETIST, CERTIFIED REGISTERED

## 2019-12-04 PROCEDURE — 76210000063 HC OR PH I REC FIRST 0.5 HR: Performed by: SURGERY

## 2019-12-04 PROCEDURE — 77030037032 HC INSRT SCIS CLICKLLINE DISP STOR -B: Performed by: SURGERY

## 2019-12-04 PROCEDURE — 77030012770 HC TRCR OPT FX AMR -B: Performed by: SURGERY

## 2019-12-04 PROCEDURE — 74011250636 HC RX REV CODE- 250/636: Performed by: ANESTHESIOLOGY

## 2019-12-04 PROCEDURE — 74011000250 HC RX REV CODE- 250: Performed by: SURGERY

## 2019-12-04 PROCEDURE — 77030008771 HC TU NG SALEM SUMP -A: Performed by: ANESTHESIOLOGY

## 2019-12-04 PROCEDURE — 77030013079 HC BLNKT BAIR HGGR 3M -A: Performed by: ANESTHESIOLOGY

## 2019-12-04 PROCEDURE — 76060000034 HC ANESTHESIA 1.5 TO 2 HR: Performed by: SURGERY

## 2019-12-04 PROCEDURE — 77030020829: Performed by: SURGERY

## 2019-12-04 PROCEDURE — 88304 TISSUE EXAM BY PATHOLOGIST: CPT

## 2019-12-04 PROCEDURE — 84450 TRANSFERASE (AST) (SGOT): CPT

## 2019-12-04 PROCEDURE — 77030017006 HC DISECTR CRV1 J&J -B: Performed by: SURGERY

## 2019-12-04 PROCEDURE — 77030020053 HC ELECTRD LAPSCP COVD -B: Performed by: SURGERY

## 2019-12-04 PROCEDURE — 77030010507 HC ADH SKN DERMBND J&J -B: Performed by: SURGERY

## 2019-12-04 RX ORDER — NALOXONE HYDROCHLORIDE 4 MG/.1ML
SPRAY NASAL
Qty: 1 EACH | Refills: 0 | Status: SHIPPED | OUTPATIENT
Start: 2019-12-04 | End: 2019-12-11

## 2019-12-04 RX ORDER — HYDROMORPHONE HYDROCHLORIDE 1 MG/ML
0.2 INJECTION, SOLUTION INTRAMUSCULAR; INTRAVENOUS; SUBCUTANEOUS
Status: DISCONTINUED | OUTPATIENT
Start: 2019-12-04 | End: 2019-12-04 | Stop reason: HOSPADM

## 2019-12-04 RX ORDER — SUCCINYLCHOLINE CHLORIDE 20 MG/ML
INJECTION INTRAMUSCULAR; INTRAVENOUS AS NEEDED
Status: DISCONTINUED | OUTPATIENT
Start: 2019-12-04 | End: 2019-12-04 | Stop reason: HOSPADM

## 2019-12-04 RX ORDER — SODIUM CHLORIDE 0.9 % (FLUSH) 0.9 %
5-40 SYRINGE (ML) INJECTION AS NEEDED
Status: DISCONTINUED | OUTPATIENT
Start: 2019-12-04 | End: 2019-12-04 | Stop reason: HOSPADM

## 2019-12-04 RX ORDER — HYDROCODONE BITARTRATE AND ACETAMINOPHEN 5; 325 MG/1; MG/1
1 TABLET ORAL ONCE
Status: COMPLETED | OUTPATIENT
Start: 2019-12-04 | End: 2019-12-04

## 2019-12-04 RX ORDER — HYDROCODONE BITARTRATE AND ACETAMINOPHEN 5; 325 MG/1; MG/1
1 TABLET ORAL
Qty: 40 TAB | Refills: 0 | Status: SHIPPED | OUTPATIENT
Start: 2019-12-04 | End: 2019-12-11

## 2019-12-04 RX ORDER — SODIUM CHLORIDE 0.9 % (FLUSH) 0.9 %
5-40 SYRINGE (ML) INJECTION EVERY 8 HOURS
Status: DISCONTINUED | OUTPATIENT
Start: 2019-12-04 | End: 2019-12-04 | Stop reason: HOSPADM

## 2019-12-04 RX ORDER — SODIUM CHLORIDE, SODIUM LACTATE, POTASSIUM CHLORIDE, CALCIUM CHLORIDE 600; 310; 30; 20 MG/100ML; MG/100ML; MG/100ML; MG/100ML
100 INJECTION, SOLUTION INTRAVENOUS CONTINUOUS
Status: DISCONTINUED | OUTPATIENT
Start: 2019-12-04 | End: 2019-12-04 | Stop reason: HOSPADM

## 2019-12-04 RX ORDER — MIDAZOLAM HYDROCHLORIDE 1 MG/ML
INJECTION, SOLUTION INTRAMUSCULAR; INTRAVENOUS AS NEEDED
Status: DISCONTINUED | OUTPATIENT
Start: 2019-12-04 | End: 2019-12-04 | Stop reason: HOSPADM

## 2019-12-04 RX ORDER — LIDOCAINE HYDROCHLORIDE 10 MG/ML
0.1 INJECTION, SOLUTION EPIDURAL; INFILTRATION; INTRACAUDAL; PERINEURAL AS NEEDED
Status: DISCONTINUED | OUTPATIENT
Start: 2019-12-04 | End: 2019-12-04 | Stop reason: HOSPADM

## 2019-12-04 RX ORDER — FENTANYL CITRATE 50 UG/ML
50 INJECTION, SOLUTION INTRAMUSCULAR; INTRAVENOUS AS NEEDED
Status: DISCONTINUED | OUTPATIENT
Start: 2019-12-04 | End: 2019-12-04 | Stop reason: HOSPADM

## 2019-12-04 RX ORDER — GENTAMICIN SULFATE 80 MG/100ML
INJECTION, SOLUTION INTRAVENOUS AS NEEDED
Status: DISCONTINUED | OUTPATIENT
Start: 2019-12-04 | End: 2019-12-04 | Stop reason: HOSPADM

## 2019-12-04 RX ORDER — DEXAMETHASONE SODIUM PHOSPHATE 4 MG/ML
INJECTION, SOLUTION INTRA-ARTICULAR; INTRALESIONAL; INTRAMUSCULAR; INTRAVENOUS; SOFT TISSUE AS NEEDED
Status: DISCONTINUED | OUTPATIENT
Start: 2019-12-04 | End: 2019-12-04 | Stop reason: HOSPADM

## 2019-12-04 RX ORDER — ROPIVACAINE HYDROCHLORIDE 5 MG/ML
150 INJECTION, SOLUTION EPIDURAL; INFILTRATION; PERINEURAL AS NEEDED
Status: DISCONTINUED | OUTPATIENT
Start: 2019-12-04 | End: 2019-12-04 | Stop reason: HOSPADM

## 2019-12-04 RX ORDER — MIDAZOLAM HYDROCHLORIDE 1 MG/ML
0.5 INJECTION, SOLUTION INTRAMUSCULAR; INTRAVENOUS
Status: DISCONTINUED | OUTPATIENT
Start: 2019-12-04 | End: 2019-12-04 | Stop reason: HOSPADM

## 2019-12-04 RX ORDER — ONDANSETRON 2 MG/ML
INJECTION INTRAMUSCULAR; INTRAVENOUS AS NEEDED
Status: DISCONTINUED | OUTPATIENT
Start: 2019-12-04 | End: 2019-12-04 | Stop reason: HOSPADM

## 2019-12-04 RX ORDER — ROCURONIUM BROMIDE 10 MG/ML
INJECTION, SOLUTION INTRAVENOUS AS NEEDED
Status: DISCONTINUED | OUTPATIENT
Start: 2019-12-04 | End: 2019-12-04 | Stop reason: HOSPADM

## 2019-12-04 RX ORDER — HYDROCODONE BITARTRATE AND ACETAMINOPHEN 5; 325 MG/1; MG/1
TABLET ORAL
Status: COMPLETED
Start: 2019-12-04 | End: 2019-12-04

## 2019-12-04 RX ORDER — FENTANYL CITRATE 50 UG/ML
INJECTION, SOLUTION INTRAMUSCULAR; INTRAVENOUS AS NEEDED
Status: DISCONTINUED | OUTPATIENT
Start: 2019-12-04 | End: 2019-12-04 | Stop reason: HOSPADM

## 2019-12-04 RX ORDER — BUPIVACAINE HYDROCHLORIDE AND EPINEPHRINE 2.5; 5 MG/ML; UG/ML
INJECTION, SOLUTION EPIDURAL; INFILTRATION; INTRACAUDAL; PERINEURAL AS NEEDED
Status: DISCONTINUED | OUTPATIENT
Start: 2019-12-04 | End: 2019-12-04 | Stop reason: HOSPADM

## 2019-12-04 RX ORDER — KETOROLAC TROMETHAMINE 30 MG/ML
INJECTION, SOLUTION INTRAMUSCULAR; INTRAVENOUS AS NEEDED
Status: DISCONTINUED | OUTPATIENT
Start: 2019-12-04 | End: 2019-12-04 | Stop reason: HOSPADM

## 2019-12-04 RX ORDER — MIDAZOLAM HYDROCHLORIDE 1 MG/ML
1 INJECTION, SOLUTION INTRAMUSCULAR; INTRAVENOUS AS NEEDED
Status: DISCONTINUED | OUTPATIENT
Start: 2019-12-04 | End: 2019-12-04 | Stop reason: HOSPADM

## 2019-12-04 RX ORDER — LIDOCAINE HYDROCHLORIDE 20 MG/ML
INJECTION, SOLUTION EPIDURAL; INFILTRATION; INTRACAUDAL; PERINEURAL AS NEEDED
Status: DISCONTINUED | OUTPATIENT
Start: 2019-12-04 | End: 2019-12-04 | Stop reason: HOSPADM

## 2019-12-04 RX ORDER — METRONIDAZOLE 500 MG/100ML
INJECTION, SOLUTION INTRAVENOUS AS NEEDED
Status: DISCONTINUED | OUTPATIENT
Start: 2019-12-04 | End: 2019-12-04 | Stop reason: HOSPADM

## 2019-12-04 RX ORDER — DIPHENHYDRAMINE HYDROCHLORIDE 50 MG/ML
12.5 INJECTION, SOLUTION INTRAMUSCULAR; INTRAVENOUS AS NEEDED
Status: DISCONTINUED | OUTPATIENT
Start: 2019-12-04 | End: 2019-12-04 | Stop reason: HOSPADM

## 2019-12-04 RX ORDER — ENOXAPARIN SODIUM 100 MG/ML
70 INJECTION SUBCUTANEOUS EVERY 12 HOURS
Qty: 10.4 ML | Refills: 0 | Status: SHIPPED | OUTPATIENT
Start: 2019-12-04 | End: 2019-12-11

## 2019-12-04 RX ORDER — PROPOFOL 10 MG/ML
INJECTION, EMULSION INTRAVENOUS AS NEEDED
Status: DISCONTINUED | OUTPATIENT
Start: 2019-12-04 | End: 2019-12-04 | Stop reason: HOSPADM

## 2019-12-04 RX ORDER — FENTANYL CITRATE 50 UG/ML
25 INJECTION, SOLUTION INTRAMUSCULAR; INTRAVENOUS
Status: DISCONTINUED | OUTPATIENT
Start: 2019-12-04 | End: 2019-12-04 | Stop reason: HOSPADM

## 2019-12-04 RX ORDER — METRONIDAZOLE 500 MG/100ML
500 INJECTION, SOLUTION INTRAVENOUS ONCE
Status: DISCONTINUED | OUTPATIENT
Start: 2019-12-04 | End: 2019-12-04 | Stop reason: HOSPADM

## 2019-12-04 RX ADMIN — MEPERIDINE HYDROCHLORIDE 5 MG: 50 INJECTION INTRAMUSCULAR; INTRAVENOUS; SUBCUTANEOUS at 10:37

## 2019-12-04 RX ADMIN — METRONIDAZOLE 500 MG: 500 SOLUTION INTRAVENOUS at 09:38

## 2019-12-04 RX ADMIN — MEPERIDINE HYDROCHLORIDE 10 MG: 50 INJECTION INTRAMUSCULAR; INTRAVENOUS; SUBCUTANEOUS at 10:02

## 2019-12-04 RX ADMIN — LIDOCAINE HYDROCHLORIDE 40 MG: 20 INJECTION, SOLUTION EPIDURAL; INFILTRATION; INTRACAUDAL; PERINEURAL at 09:12

## 2019-12-04 RX ADMIN — ONDANSETRON HYDROCHLORIDE 4 MG: 2 INJECTION, SOLUTION INTRAMUSCULAR; INTRAVENOUS at 10:22

## 2019-12-04 RX ADMIN — ROCURONIUM BROMIDE 5 MG: 10 SOLUTION INTRAVENOUS at 09:10

## 2019-12-04 RX ADMIN — FENTANYL CITRATE 50 MCG: 50 INJECTION, SOLUTION INTRAMUSCULAR; INTRAVENOUS at 09:09

## 2019-12-04 RX ADMIN — HYDROCODONE BITARTRATE AND ACETAMINOPHEN 1 TABLET: 5; 325 TABLET ORAL at 11:43

## 2019-12-04 RX ADMIN — MIDAZOLAM 2 MG: 1 INJECTION INTRAMUSCULAR; INTRAVENOUS at 09:02

## 2019-12-04 RX ADMIN — DEXMEDETOMIDINE HYDROCHLORIDE 5 MCG: 100 INJECTION, SOLUTION, CONCENTRATE INTRAVENOUS at 09:35

## 2019-12-04 RX ADMIN — DEXMEDETOMIDINE HYDROCHLORIDE 5 MCG: 100 INJECTION, SOLUTION, CONCENTRATE INTRAVENOUS at 09:02

## 2019-12-04 RX ADMIN — SODIUM CHLORIDE, SODIUM LACTATE, POTASSIUM CHLORIDE, AND CALCIUM CHLORIDE 100 ML/HR: 600; 310; 30; 20 INJECTION, SOLUTION INTRAVENOUS at 08:51

## 2019-12-04 RX ADMIN — KETOROLAC TROMETHAMINE 30 MG: 30 INJECTION, SOLUTION INTRAMUSCULAR; INTRAVENOUS at 10:13

## 2019-12-04 RX ADMIN — MEPERIDINE HYDROCHLORIDE 10 MG: 50 INJECTION INTRAMUSCULAR; INTRAVENOUS; SUBCUTANEOUS at 10:33

## 2019-12-04 RX ADMIN — SUCCINYLCHOLINE CHLORIDE 120 MG: 20 INJECTION, SOLUTION INTRAMUSCULAR; INTRAVENOUS at 09:12

## 2019-12-04 RX ADMIN — ROCURONIUM BROMIDE 25 MG: 10 SOLUTION INTRAVENOUS at 09:25

## 2019-12-04 RX ADMIN — GENTAMICIN SULFATE 262 MG: 0.8 INJECTION, SOLUTION INTRAVENOUS at 09:29

## 2019-12-04 RX ADMIN — PROPOFOL 120 MG: 10 INJECTION, EMULSION INTRAVENOUS at 09:12

## 2019-12-04 RX ADMIN — DEXAMETHASONE SODIUM PHOSPHATE 6 MG: 4 INJECTION, SOLUTION INTRAMUSCULAR; INTRAVENOUS at 09:33

## 2019-12-04 RX ADMIN — PROPOFOL 30 MG: 10 INJECTION, EMULSION INTRAVENOUS at 09:02

## 2019-12-04 RX ADMIN — DEXMEDETOMIDINE HYDROCHLORIDE 5 MCG: 100 INJECTION, SOLUTION, CONCENTRATE INTRAVENOUS at 09:10

## 2019-12-04 RX ADMIN — ROCURONIUM BROMIDE 20 MG: 10 SOLUTION INTRAVENOUS at 09:31

## 2019-12-04 NOTE — TELEPHONE ENCOUNTER
Per Pharmacist needs clarity 80mg or 70mg. She has the 80mg, but not the 70mg, but has smaller vials that would equal 70mg. Advised would speak to Dr. Candance Glen and return call. Spoke to Dr. Candance Glen and confirmed 70 mg. Called Pharmacy and spoke to Tawny and confirmed 70 mg injection. Per Tawny patient will have to inject twice and the 70mg isn't available. Will advise Dr. Candance Glen.

## 2019-12-04 NOTE — OP NOTES
OPERATIVE NOTE    Date of Procedure: 12/4/2019     Preoperative Diagnosis: Biliary dyskinesia     Postoperative Diagnosis: Biliary dyskinesia       Procedure: Procedure(s):  LAPAROSCOPIC CHOLECYSTECTOMY    Surgeon: Loni Spann MD     Assistant(s): Adriano Martinez     Anesthesia: General     Estimated Blood Loss: 5 mL    Specimens:   ID Type Source Tests Collected by Time Destination   1 : Gallbladder Fresh Gallbladder  Judit Jarrett MD 12/4/2019 1005 Pathology      Findings: Gallbladder with signs of mild chronic inflammation. Appendix appeared normal. Normal ductal and arterial anatomy noted. Indications: The patient has had recurrent complaints of nausea and RUQ pain. HIDA scan showed a significantly reduced EF consistent with biliary dyskinesia. Her radiology read on a CT scan from 3 months ago was updated with an addendum that showed some possible appendiceal inflammation. Recommendation was made for laparoscopic cholecystectomy. I discussed planning to look at her appendix as well during the surgery. Description of Operation: Eloisa Singleton was identified in the pre-operative holding area. Informed consent was obtained after a complete discussion of risks benefits and alternatives to surgery were had with the patient. The patient was brought back to the OR and placed under general endotracheal anesthesia in the supine position on the operating room table with the arms extended and a footboard in place. The patient was then prepped and draped in the usual sterile fashion. A proper timeout was performed. We then injected local anesthetic into the diane-umbilical skin and subcutaneous tissue. A 5 mm infraumbilical incision was then made using an 11 blade. We dissected down to the abdominal wall fascia at the base of the umbilicus with a Kocher clamp. This was grasped and retracted anteriorly.   A Veress needle was then passed into the abdomen and a standard water-drop technique test was performed. The abdomen was insufflated to 15 mm Hg. The Veress was then removed and a 5 mm Optiview trocar was placed with the scope inserted. The abdomen was entered safely. Additional trocars including a 12 mm subxyphoid port and two 5 mm RUQ trocars were placed in usual locations for laparoscopic cholecystectomy. These were all placed after injection of local anesthetic and under direct visualization. Using graspers, we identified the cecum, terminal ileum and appendix. The appendix appeared normal throughout the entire length with no evidence of mass, inflammation or mucin. It was returned to its normal position. The patient was then placed in steep reverse Trendelenburg position. The dome of the gallbladder was grasped and retracted anteriorly and laterally over the liver edge. The infundibulum was grasped and retracted laterally. There were some inflammatory adhesions of omentum to the gallbladder that were taken down with cautery. The triangle of Calot was then meticulously dissected by taking down the overlying peritoneum. A critical view of safety was obtained. The cystic duct and cystic artery were clearly identified and cleared of surrounding tissues. Two clips were placed on the patient side of these structures and 1 clip on the specimen side, and then both structures were divided with scissors. We then dissected the gallbladder off of the hepatic bed using hook electrocautery. The gallbladder was passed into an endocatch bag and removed through the 12 mm subxyphoid site. The clips and gallbladder fossa were then re-examined and noted to be hemostatic. There was no spillage of bile during the case. The 12 mm subxyphoid site was then closed using an 0-vicryl suture on a suture passer to re-approximate the fascia. The abdomen was then allowed to desufflate and all remaining trocars were removed. Additional local anesthetic was injected at all incision sites.   The skin was then re-approximated using 4-0 monocryl subcuticular stitches and dermabond skin adhesive. All needle and instrument counts were correct at the completion of the case. I was present and scrubbed throughout the entirety of the case. There were no immediate complications.       Complications: None    Implants: * No implants in log *    Rajiv Trinh MD  12/4/2019

## 2019-12-04 NOTE — ANESTHESIA POSTPROCEDURE EVALUATION
Post-Anesthesia Evaluation and Assessment    Patient: Alexis Reyna MRN: [id number]  SSN: [id number]    YOB: 1975  Age: 45 y.o. Sex: female      I have evaluated the patient and they are stable and ready for discharge from the PACU. Cardiovascular Function/Vital Signs  Visit Vitals  /71 (BP 1 Location: Right arm, BP Patient Position: At rest)   Pulse 80   Temp 36.7 °C (98 °F)   Resp 18   Ht 5' 3\" (1.6 m)   Wt 73 kg (161 lb)   SpO2 98%   BMI 28.52 kg/m²       Patient is status post General anesthesia for Procedure(s):  LAPAROSCOPIC CHOLECYSTECTOMY. Nausea/Vomiting: None    Postoperative hydration reviewed and adequate. Pain:  Pain Scale 1: Numeric (0 - 10) (12/04/19 1045)  Pain Intensity 1: 0 (12/04/19 1045)   Managed    Neurological Status:   Neuro (WDL): Within Defined Limits (12/04/19 1045)  Neuro  LUE Motor Response: Spontaneous  (12/04/19 1045)  LLE Motor Response: Spontaneous  (12/04/19 1045)  RUE Motor Response: Spontaneous  (12/04/19 1045)  RLE Motor Response: Spontaneous  (12/04/19 1045)   At baseline    Mental Status, Level of Consciousness: Alert and  oriented to person, place, and time    Pulmonary Status:   O2 Device: Room air (12/04/19 1045)   Adequate oxygenation and airway patent    Complications related to anesthesia: None    Post-anesthesia assessment completed. No concerns    Signed By: Abhinav Tatum MD     December 4, 2019              Procedure(s):  LAPAROSCOPIC CHOLECYSTECTOMY.     general    <BSHSIANPOST>    Vitals Value Taken Time   /71 12/4/2019 10:45 AM   Temp 36.7 °C (98 °F) 12/4/2019 10:45 AM   Pulse 80 12/4/2019 11:00 AM   Resp 18 12/4/2019 11:00 AM   SpO2 98 % 12/4/2019 11:00 AM

## 2019-12-04 NOTE — H&P
Date of Surgery Update:  Nany Rodriguez was seen and examined. History and physical has been reviewed. The patient has been examined. There have been no significant clinical changes since the completion of the originally dated History and Physical.  She continues to have intermittent pain attacks. Patient with biliary dyskinesia. Will plan for laparoscopic cholecystectomy. A complete discussion of the risks, benefits and alternatives to surgery were discussed with the patient who was keen to proceed. Signed By: Danny Ge MD     December 4, 2019 8:57 AM         Please note from the office and include the additional information below:    Past Medical History  Past Medical History:   Diagnosis Date    Abdominal spasms     [facility] disorder [address]     Dysmenorrhea     Factor V Leiden mutation ([facility]     Headache(784.0)     migraines    Hypercholesterolemia     Ill-defined condition 11/22/2019    BILLARY DYSKNESIA    Panic attacks     Psychiatric disorder     ANXIETY    PVC's         Past Surgical History  Past Surgical History:   Procedure Laterality Date    HX GI  2008    COLONOSCOPY    HX HEENT Bilateral 2007    lasik    HX HEENT  1997    WISDOM TEETH EXTRACTION     HX UROLOGICAL      dilation age 7        Social History  The patient Nany Rodriguez  reports that she has never smoked. She has never used smokeless tobacco. She reports current alcohol use. She reports that she does not use drugs. Family History  Family History   Problem Relation Age of Onset    Clotting Disorder Father     Other Sister         protein C defic., DVT    Cancer Maternal Grandmother         breast    Depression Maternal Grandmother     Hypertension Maternal Grandmother     Cancer Paternal Grandmother         ?     Diabetes Paternal Grandmother     Heart Disease Maternal Anneliese Parks Stroke Maternal Grandfather     Anesth Problems Neg Hx           Danny Ge MD

## 2019-12-04 NOTE — ANESTHESIA PREPROCEDURE EVALUATION
Relevant Problems   No relevant active problems       Anesthetic History   No history of anesthetic complications            Review of Systems / Medical History  Patient summary reviewed, nursing notes reviewed and pertinent labs reviewed    Pulmonary  Within defined limits                 Neuro/Psych   Within defined limits           Cardiovascular            Dysrhythmias            GI/Hepatic/Renal  Within defined limits              Endo/Other  Within defined limits           Other Findings              Physical Exam    Airway  Mallampati: II  TM Distance: > 6 cm  Neck ROM: normal range of motion   Mouth opening: Normal     Cardiovascular  Regular rate and rhythm,  S1 and S2 normal,  no murmur, click, rub, or gallop             Dental  No notable dental hx       Pulmonary  Breath sounds clear to auscultation               Abdominal  GI exam deferred       Other Findings            Anesthetic Plan    ASA: 2  Anesthesia type: general          Induction: Intravenous  Anesthetic plan and risks discussed with: Patient

## 2019-12-04 NOTE — DISCHARGE INSTRUCTIONS
______________________________________________________________________    Anesthesia Discharge Instructions    After general anesthesia or intervenous sedation, for 24 hours or while taking prescription Narcotics:  · Limit your activities  · Do not drive or operate hazardous machinery  · If you have not urinated within 8 hours after discharge, please contact your surgeon on call. · Do not make important personal or business decisions  · Do not drink alcoholic beverages    Report the following to your surgeon:  · Excessive pain, swelling, redness or odor of or around the surgical area  · Temperature over 100.5 degrees  · Nausea and vomiting lasting longer than 4 hours or if unable to take medication  · Any signs of decreased circulation or nerve impairment to extremity:  Change in color, persistent numbness, tingling, coldness or increased pain. · Any questions    Laparoscopic cholecystectomy      Patient Discharge Instructions    Luis Enrique Cameron / [id number] : 1975    Admitted 2019 Discharged: 2019       PATIENT INSTRUCTIONS  GALLBLADDER SURGERY  (CHOLECYSTECTOMY)    FOLLOW-UP:  Please make an appointment with your physician in 10 - 14 day(s). Call your physician immediately if you have any fevers greater than 101.5, drainage from your wound that is not clear or looks infected, persistent bleeding, increasing abdominal pain, problems urinating, or persistent nausea/vomiting. You should be aware that you may have right shoulder pain after surgery and that this will progressively go away. This is called 'referred pain' and is from the area of the gallbladder. It can also be caused by gas that may be trapped under the diaphragm from the surgery, especially if it was performed laparoscopically through mini-incisions. This gas will progressively get reabsorbed by your body. WOUND CARE INSTRUCTIONS:   You may shower at home.  If clothing rubs against the wound or causes irritation and the wound is not draining you may cover it with a dry dressing during the daytime. Try to keep the wound dry and avoid ointments on the wound unless directed to do so. If the wound becomes bright red and painful or starts to drain infected material that is not clear, please contact your physician immediately. You should also call if you begin to drain fluid that is thin and greenish-brown from the wound and appears to look like bile. If the wound though is mildly pink and has a thick firm ridge underneath it, this is normal, and is referred to as a healing ridge. This will resolve over the next 4-6 weeks. DIET:  You may eat any foods that you can tolerate. It is a good idea to eat a high fiber diet and take in plenty of fluids to prevent constipation. If you do become constipated you may want to take a mild laxative or take ducolax tablets on a daily basis until your bowel habits are regular. Constipation can be very uncomfortable, along with straining, after recent abdominal surgery. ACTIVITY:  You are encouraged to cough and deep breath or use your incentive spirometer if you were given one, every 15-30 minutes when awake. This will help prevent respiratory complications and low grade fevers post-operatively. You may want to hug a pillow when coughing and sneezing to add additional support to the surgical area(s) which will decrease pain during these times. You are encouraged to walk and engage in light activity for the next two weeks. You should not lift more than 20 pounds during this time frame as it could put you at increased risk for a post-operative hernia. Twenty pounds is roughly equivalent to a plastic bag of groceries. · Most people are able to return to work within 1 to 2 weeks after surgery. · You may shower 24 hours after surgery. Pat the cut (incision) dry. Do not take a bath for the first week. · Your doctor will tell you when you can have sex again.     MEDICATIONS:  Try to take narcotic medications and anti-inflammatory medications, such as tylenol, ibuprofen, naprosyn, etc., with food. This will minimize stomach upset from the medication. Should you develop nausea and vomiting from the pain medication, or develop a rash, please discontinue the medication and contact your physician. You should not drive, make important decisions, or operate machinery when taking narcotic pain medication. · Take hydrocodone/acetaminophen (Lorcet, Lortab, Maxidone, Norco, Vicodin, [place], [place]) as needed for severe pain. You can take tylenol instead for mild pain but should not take tylenol in addition to this medication as it contains some tylenol already. You should not take ibuprofen, motrin, alleve or other NSAIDs while on lovenox. · Take lovenox as a twice daily injection for 7 days to reduce risk of post-operative blood clots. Your first dose should be in the evening on the day of your surgery. QUESTIONS:  Please feel free to call Dr. Alea Oropeza office ([phone]) if you have any questions, and they will be glad to assist you. Follow-up with Dr. Rox Pickens or a member of his team in 2 week(s). Call the office to schedule your appointment.

## 2019-12-04 NOTE — ROUTINE PROCESS
Patient: Catalina Jara MRN: [id number]  SSN: [id number] YOB: 1975  Age: 45 y.o. Sex: female Patient is status post Procedure(s): LAPAROSCOPIC CHOLECYSTECTOMY. Surgeon(s) and Role: Lukas Tamez MD - Primary Local/Dose/Irrigation:  Normal saline on sterile field for irrigation Peripheral IV 12/04/19 Left Hand (Active) Dressing Status New 12/4/2019  8:50 AM  
Dressing Type Transparent 12/4/2019  8:50 AM  
Hub Color/Line Status Infusing 12/4/2019  8:50 AM  
      
Orogastric Tube 12/04/19 (Active) Airway - Endotracheal Tube 12/04/19 (Active) Dressing/Packing:  Wound Abdomen 4 trocar sites-Dressing Type: Sutures; Topical skin adhesive/glue(Dermabond) (12/04/19 1142) Splint/Cast:  ] Other:  SCDs

## 2019-12-09 ENCOUNTER — TELEPHONE (OUTPATIENT)
Dept: SURGERY | Age: 44
End: 2019-12-09

## 2019-12-09 NOTE — TELEPHONE ENCOUNTER
Returned call to patient. Two patient identifiers used. Patient was confused about her post op appt being set up to see Dr. Kalia Yarbrough instead of Dr. Dm Flores. Explained to patient we have changed her appt to see Dr. Dm Flores on 12/19/2019.

## 2019-12-11 ENCOUNTER — OFFICE VISIT (OUTPATIENT)
Dept: INTERNAL MEDICINE CLINIC | Age: 44
End: 2019-12-11

## 2019-12-11 VITALS
HEART RATE: 77 BPM | HEIGHT: 63 IN | SYSTOLIC BLOOD PRESSURE: 115 MMHG | TEMPERATURE: 98.3 F | DIASTOLIC BLOOD PRESSURE: 78 MMHG | OXYGEN SATURATION: 99 % | WEIGHT: 157.2 LBS | RESPIRATION RATE: 16 BRPM | BODY MASS INDEX: 27.85 KG/M2

## 2019-12-11 DIAGNOSIS — J02.9 SORE THROAT: Primary | ICD-10-CM

## 2019-12-11 LAB
S PYO AG THROAT QL: NEGATIVE
VALID INTERNAL CONTROL?: YES

## 2019-12-11 RX ORDER — CLOTRIMAZOLE 10 MG/1
10 LOZENGE ORAL; TOPICAL 4 TIMES DAILY
Qty: 40 TAB | Refills: 0 | Status: SHIPPED | OUTPATIENT
Start: 2019-12-11 | End: 2019-12-21

## 2019-12-11 NOTE — PROGRESS NOTES
Pt states she started with sore throat and inside her mouth was sore on Monday. Then yesterday she noticed white patches in her mouth and on her tongue. No fever.

## 2019-12-11 NOTE — PROGRESS NOTES
HISTORY OF PRESENT ILLNESS  Katelyn James is a 45 y.o. female. Sore Throat    The history is provided by the patient and relative (in with Mom). This is a new problem. The current episode started 2 days ago. The problem has been gradually worsening. There has been no fever. Associated symptoms include trouble swallowing. Pertinent negatives include no diarrhea, no vomiting, no congestion and no cough. She has had no exposure to strep or mono. Treatments tried: salt water gargles. Recent surgery- Lap cosme. No new meds, foods. Had pre op abx only    Review of Systems   Constitutional: Negative for malaise/fatigue. HENT: Positive for sore throat and trouble swallowing. Negative for congestion. Respiratory: Negative for cough. Gastrointestinal: Negative for diarrhea and vomiting. Physical Exam  Vitals signs and nursing note reviewed. Constitutional:       General: She is not in acute distress. HENT:      Mouth/Throat:      Pharynx: Oropharyngeal exudate and posterior oropharyngeal erythema present. Eyes:      Conjunctiva/sclera: Conjunctivae normal.   Cardiovascular:      Rate and Rhythm: Normal rate and regular rhythm. Heart sounds: No murmur. No friction rub. No gallop. Pulmonary:      Effort: Pulmonary effort is normal.      Breath sounds: Normal breath sounds. ASSESSMENT and PLAN  Diagnoses and all orders for this visit:    1. Sore throat- thrush vs allergic rxn vs viral process  -     AMB POC RAPID STREP A- neg  -     UPPER RESPIRATORY CULTURE  -     AMB SUPPLY ORDER- magic mouthwash     Other orders  -     clotrimazole (MYCELEX) 10 mg fay; Take 1 Tab by mouth four (4) times daily for 10 days.

## 2019-12-12 ENCOUNTER — TELEPHONE (OUTPATIENT)
Dept: INTERNAL MEDICINE CLINIC | Age: 44
End: 2019-12-12

## 2019-12-12 NOTE — TELEPHONE ENCOUNTER
Spoke with pt - states [facility] told her they had sent request for vo for Magic Mouth Wash. Advised her we had not received anything. I would call them to find out what they needed. Called [facility] ([phone]) - spoke with Armando Sevilla, Pharmacy Tech. She wanted to verify Rx given to them. Read the Rx to her as MD has ordered:  Magic mouth wash:   1- 125 ml maalox  2- 125 ml viscous lidocaine  3- 125 ml liquid benadryl    Sig- rinse and spit 15 ml tid     375 ml, 1 rf  She was fine with this and pharmacist will fill for pt. Pt called back and was told this was done.

## 2019-12-12 NOTE — TELEPHONE ENCOUNTER
Pharm has questions about the \"Magic Mouth Wash\"  Pharm has sent over the questions yesterday and still not heard back from the Dr.  This mouth wash has to be compounded.   Pt Advise - [phone]

## 2019-12-14 LAB — BACTERIA SPEC RESP CULT: NORMAL

## 2019-12-19 ENCOUNTER — OFFICE VISIT (OUTPATIENT)
Dept: SURGERY | Age: 44
End: 2019-12-19

## 2019-12-19 VITALS
DIASTOLIC BLOOD PRESSURE: 70 MMHG | TEMPERATURE: 97.5 F | RESPIRATION RATE: 17 BRPM | HEART RATE: 77 BPM | SYSTOLIC BLOOD PRESSURE: 108 MMHG | OXYGEN SATURATION: 98 % | BODY MASS INDEX: 26.7 KG/M2 | WEIGHT: 156.4 LBS | HEIGHT: 64 IN

## 2019-12-19 DIAGNOSIS — K82.8 BILIARY DYSKINESIA: Primary | ICD-10-CM

## 2019-12-19 NOTE — PROGRESS NOTES
1. Have you been to the ER, urgent care clinic since your last visit? Hospitalized since your last visit? No    2. Have you seen or consulted any other health care providers outside of the [address] [place] [address] since your last visit? Include any pap smears or colon screening.  No

## 2019-12-19 NOTE — LETTER
12/23/19 Patient: Tricia Cason YOB: 1975 Date of Visit: 12/19/2019 Laura García MD 
[address] [facility] [address] [place] [address] [phone] VIA In Basket Dear Laura García MD, Thank you for referring Ms. Tricia Cason to [facility] for evaluation. My notes for this consultation are attached. If you have questions, please do not hesitate to call me. I look forward to following your patient along with you.  
 
 
Sincerely, 
 
Ana Jaime MD

## 2019-12-23 NOTE — PROGRESS NOTES
[facility]      Clinic Note - Follow up    Subjective     Wesly Smith returns for scheduled follow up today. She is s/p laparoscopic cholecystectomy on 12/4/19 for biliary dyskinesia. She is doing well. She has completed her lovenox injections for prophylaxis as recommended by her Hematologist.  No complaints today. Objective     Visit Vitals  /70 (BP 1 Location: Left arm, BP Patient Position: Sitting)   Pulse 77   Temp 97.5 °F (36.4 °C) (Oral)   Resp 17   Ht 5' 4\" (1.626 m)   Wt 156 lb 6.4 oz (70.9 kg)   SpO2 98%   BMI 26.85 kg/m²         PE  GEN - Awake, alert, communicating appropriately. NAD  Pulm - CTAB  CV - RRR  Abd - soft, NT, ND. Incisions healing nicely. Ext - warm, well perfused, no edema. Labs  None    Assessment     Wesly Smith is a 45 y. o.yr old female s/p laparoscopic cholecystectomy on 12/4/19 for biliary dyskinesia. Plan     She is doing well. I will see her back as needed. She may increase activity levels as tolerated.      Camacho Gonzalez MD  12/19/19    CC: Yonatan Ford MD

## 2019-12-26 DIAGNOSIS — R11.0 NAUSEA: Primary | ICD-10-CM

## 2019-12-27 RX ORDER — ONDANSETRON 4 MG/1
TABLET, ORALLY DISINTEGRATING ORAL
Qty: 30 TAB | Refills: 0 | Status: SHIPPED | OUTPATIENT
Start: 2019-12-27 | End: 2020-03-08

## 2020-01-18 LAB
ALBUMIN SERPL-MCNC: 4.5 G/DL (ref 3.5–5.5)
ALBUMIN/GLOB SERPL: 1.7 {RATIO} (ref 1.2–2.2)
ALP SERPL-CCNC: 208 IU/L (ref 39–117)
ALT SERPL-CCNC: 304 IU/L (ref 0–32)
AST SERPL-CCNC: 89 IU/L (ref 0–40)
BASOPHILS # BLD AUTO: 0 X10E3/UL (ref 0–0.2)
BASOPHILS NFR BLD AUTO: 1 %
BILIRUB SERPL-MCNC: 0.2 MG/DL (ref 0–1.2)
BUN SERPL-MCNC: 9 MG/DL (ref 6–24)
BUN/CREAT SERPL: 11 (ref 9–23)
CALCIUM SERPL-MCNC: 9.9 MG/DL (ref 8.7–10.2)
CHLORIDE SERPL-SCNC: 100 MMOL/L (ref 96–106)
CHOLEST SERPL-MCNC: 262 MG/DL (ref 100–199)
CO2 SERPL-SCNC: 25 MMOL/L (ref 20–29)
CREAT SERPL-MCNC: 0.8 MG/DL (ref 0.57–1)
EOSINOPHIL # BLD AUTO: 0.2 X10E3/UL (ref 0–0.4)
EOSINOPHIL NFR BLD AUTO: 5 %
ERYTHROCYTE [DISTWIDTH] IN BLOOD BY AUTOMATED COUNT: 12.7 % (ref 11.7–15.4)
GLOBULIN SER CALC-MCNC: 2.6 G/DL (ref 1.5–4.5)
GLUCOSE SERPL-MCNC: 80 MG/DL (ref 65–99)
HCT VFR BLD AUTO: 40.6 % (ref 34–46.6)
HDLC SERPL-MCNC: 69 MG/DL
HGB BLD-MCNC: 13.5 G/DL (ref 11.1–15.9)
IMM GRANULOCYTES # BLD AUTO: 0 X10E3/UL (ref 0–0.1)
IMM GRANULOCYTES NFR BLD AUTO: 0 %
LDLC SERPL CALC-MCNC: 172 MG/DL (ref 0–99)
LYMPHOCYTES # BLD AUTO: 1.8 X10E3/UL (ref 0.7–3.1)
LYMPHOCYTES NFR BLD AUTO: 50 %
MCH RBC QN AUTO: 29.9 PG (ref 26.6–33)
MCHC RBC AUTO-ENTMCNC: 33.3 G/DL (ref 31.5–35.7)
MCV RBC AUTO: 90 FL (ref 79–97)
MONOCYTES # BLD AUTO: 0.3 X10E3/UL (ref 0.1–0.9)
MONOCYTES NFR BLD AUTO: 9 %
NEUTROPHILS # BLD AUTO: 1.3 X10E3/UL (ref 1.4–7)
NEUTROPHILS NFR BLD AUTO: 35 %
PLATELET # BLD AUTO: 361 X10E3/UL (ref 150–450)
POTASSIUM SERPL-SCNC: 4.7 MMOL/L (ref 3.5–5.2)
PROT SERPL-MCNC: 7.1 G/DL (ref 6–8.5)
RBC # BLD AUTO: 4.52 X10E6/UL (ref 3.77–5.28)
SODIUM SERPL-SCNC: 141 MMOL/L (ref 134–144)
TRIGL SERPL-MCNC: 105 MG/DL (ref 0–149)
VLDLC SERPL CALC-MCNC: 21 MG/DL (ref 5–40)
WBC # BLD AUTO: 3.6 X10E3/UL (ref 3.4–10.8)

## 2020-01-20 DIAGNOSIS — R79.89 ELEVATED LFTS: Primary | ICD-10-CM

## 2020-01-24 DIAGNOSIS — R79.89 ABNORMAL LFTS: Primary | ICD-10-CM

## 2020-01-24 LAB
ALBUMIN SERPL-MCNC: 4.4 G/DL (ref 3.8–4.8)
ALP SERPL-CCNC: 125 IU/L (ref 39–117)
ALT SERPL-CCNC: 48 IU/L (ref 0–32)
AST SERPL-CCNC: 14 IU/L (ref 0–40)
BILIRUB DIRECT SERPL-MCNC: 0.06 MG/DL (ref 0–0.4)
BILIRUB SERPL-MCNC: <0.2 MG/DL (ref 0–1.2)
GGT SERPL-CCNC: 155 IU/L (ref 0–60)
PROT SERPL-MCNC: 6.9 G/DL (ref 6–8.5)

## 2020-02-06 DIAGNOSIS — N94.6 SEVERE MENSTRUAL CRAMPS: ICD-10-CM

## 2020-02-06 RX ORDER — CYCLOBENZAPRINE HCL 5 MG
TABLET ORAL
Qty: 30 TAB | Refills: 0 | Status: SHIPPED | OUTPATIENT
Start: 2020-02-06 | End: 2020-03-14

## 2020-02-06 RX ORDER — ACETAMINOPHEN AND CODEINE PHOSPHATE 300; 30 MG/1; MG/1
TABLET ORAL
Qty: 30 TAB | Refills: 0 | Status: SHIPPED | OUTPATIENT
Start: 2020-02-06 | End: 2020-03-14

## 2020-02-25 LAB
ALBUMIN SERPL-MCNC: 4.6 G/DL (ref 3.8–4.8)
ALP SERPL-CCNC: 84 IU/L (ref 39–117)
ALT SERPL-CCNC: 27 IU/L (ref 0–32)
AST SERPL-CCNC: 13 IU/L (ref 0–40)
BILIRUB DIRECT SERPL-MCNC: 0.05 MG/DL (ref 0–0.4)
BILIRUB SERPL-MCNC: 0.3 MG/DL (ref 0–1.2)
GGT SERPL-CCNC: 72 IU/L (ref 0–60)
PROT SERPL-MCNC: 7.1 G/DL (ref 6–8.5)

## 2020-02-28 RX ORDER — NEBIVOLOL HYDROCHLORIDE 5 MG/1
TABLET ORAL
Qty: 45 TAB | Refills: 3 | Status: SHIPPED | OUTPATIENT
Start: 2020-02-28 | End: 2021-02-23

## 2020-03-07 DIAGNOSIS — R11.0 NAUSEA: ICD-10-CM

## 2020-03-08 RX ORDER — ONDANSETRON 4 MG/1
TABLET, ORALLY DISINTEGRATING ORAL
Qty: 30 TAB | Refills: 0 | Status: SHIPPED | OUTPATIENT
Start: 2020-03-08 | End: 2020-06-08

## 2020-03-14 DIAGNOSIS — N94.6 SEVERE MENSTRUAL CRAMPS: ICD-10-CM

## 2020-03-14 RX ORDER — CYCLOBENZAPRINE HCL 5 MG
TABLET ORAL
Qty: 30 TAB | Refills: 0 | Status: SHIPPED | OUTPATIENT
Start: 2020-03-14 | End: 2020-06-08

## 2020-03-14 RX ORDER — BUTALBITAL, ACETAMINOPHEN, CAFFEINE AND CODEINE PHOSPHATE 50; 325; 40; 30 MG/1; MG/1; MG/1; MG/1
CAPSULE ORAL
Qty: 30 CAP | Refills: 0 | Status: SHIPPED | OUTPATIENT
Start: 2020-03-14 | End: 2021-02-09

## 2020-03-14 RX ORDER — ACETAMINOPHEN AND CODEINE PHOSPHATE 300; 30 MG/1; MG/1
TABLET ORAL
Qty: 30 TAB | Refills: 0 | Status: SHIPPED | OUTPATIENT
Start: 2020-03-14 | End: 2020-08-03

## 2020-06-07 DIAGNOSIS — R11.0 NAUSEA: ICD-10-CM

## 2020-06-08 RX ORDER — ONDANSETRON 4 MG/1
TABLET, ORALLY DISINTEGRATING ORAL
Qty: 30 TAB | Refills: 0 | Status: SHIPPED | OUTPATIENT
Start: 2020-06-08 | End: 2020-08-03

## 2020-06-08 RX ORDER — OMEPRAZOLE 20 MG/1
CAPSULE, DELAYED RELEASE ORAL
Qty: 60 CAP | Refills: 6 | Status: SHIPPED | OUTPATIENT
Start: 2020-06-08 | End: 2021-07-25

## 2020-06-08 RX ORDER — CYCLOBENZAPRINE HCL 5 MG
TABLET ORAL
Qty: 30 TAB | Refills: 0 | Status: SHIPPED | OUTPATIENT
Start: 2020-06-08 | End: 2020-08-03

## 2020-07-31 DIAGNOSIS — N94.6 SEVERE MENSTRUAL CRAMPS: ICD-10-CM

## 2020-07-31 DIAGNOSIS — R11.0 NAUSEA: ICD-10-CM

## 2020-08-03 RX ORDER — CYCLOBENZAPRINE HCL 5 MG
TABLET ORAL
Qty: 30 TAB | Refills: 5 | Status: SHIPPED | OUTPATIENT
Start: 2020-08-03 | End: 2021-04-16 | Stop reason: SDUPTHER

## 2020-08-03 RX ORDER — ACETAMINOPHEN AND CODEINE PHOSPHATE 300; 30 MG/1; MG/1
TABLET ORAL
Qty: 30 TAB | Refills: 0 | Status: SHIPPED | OUTPATIENT
Start: 2020-08-03 | End: 2020-12-06

## 2020-08-03 RX ORDER — ONDANSETRON 4 MG/1
TABLET, ORALLY DISINTEGRATING ORAL
Qty: 30 TAB | Refills: 3 | Status: SHIPPED | OUTPATIENT
Start: 2020-08-03 | End: 2021-10-08

## 2020-11-11 ENCOUNTER — OFFICE VISIT (OUTPATIENT)
Dept: URGENT CARE | Age: 45
End: 2020-11-11
Payer: COMMERCIAL

## 2020-11-11 VITALS — HEART RATE: 92 BPM | TEMPERATURE: 98.7 F | RESPIRATION RATE: 16 BRPM | OXYGEN SATURATION: 96 %

## 2020-11-11 DIAGNOSIS — Z20.822 SUSPECTED COVID-19 VIRUS INFECTION: Primary | ICD-10-CM

## 2020-11-11 PROCEDURE — 99202 OFFICE O/P NEW SF 15 MIN: CPT | Performed by: FAMILY MEDICINE

## 2020-11-11 NOTE — PROGRESS NOTES
This patient was seen in Flu Clinic at [address] [place] [address] Urgent Care while in their vehicle due to COVID-19 pandemic with PPE and focused examination in order to decrease community viral transmission. The patient/guardian gave verbal consent to treat. The history is provided by the patient. Cough   The history is provided by the patient. This is a new problem. The current episode started yesterday. The problem occurs every few minutes. The problem has not changed since onset. The cough is non-productive. There has been no fever. Pertinent negatives include no chills, no headaches, no rhinorrhea, no sore throat, no shortness of breath, no wheezing, no nausea and no vomiting. She has tried nothing for the symptoms. Risk factors: no known exposure to covid. She is not a smoker. Her past medical history does not include pneumonia or asthma. Past Medical History:   Diagnosis Date    Abdominal spasms     Coagulation disorder (HCC)     Dysmenorrhea     Factor V Leiden mutation ([address]     Headache(784.0)     migraines    Hypercholesterolemia     Ill-defined condition 11/22/2019    BILLARY DYSKNESIA    Panic attacks     Psychiatric disorder     ANXIETY    PVC's         Past Surgical History:   Procedure Laterality Date    HX CHOLECYSTECTOMY  12/04/2019    HX GI  2008    COLONOSCOPY    HX HEENT Bilateral 2007    lasik    HX HEENT  1997    WISDOM TEETH EXTRACTION     HX UROLOGICAL      dilation age 7         Family History   Problem Relation Age of Onset    Clotting Disorder Father     Other Sister         protein C defic., DVT    Cancer Maternal Grandmother         breast    Depression Maternal Grandmother     Hypertension Maternal Grandmother     Cancer Paternal Grandmother         ?     Diabetes Paternal Grandmother     Heart Disease Maternal Grandfather     Stroke Maternal Grandfather     Anesth Problems Neg Hx         Social History     Socioeconomic History    Marital status: SINGLE     Spouse name: Not on file    Number of children: Not on file    Years of education: Not on file    Highest education level: Not on file   Occupational History    Not on file   Social Needs    Financial resource strain: Not on file    Food insecurity     Worry: Not on file     Inability: Not on file    Transportation needs     Medical: Not on file     Non-medical: Not on file   Tobacco Use    Smoking status: Never Smoker    Smokeless tobacco: Never Used   Substance and Sexual Activity    Alcohol use: Yes     Comment: rarely    Drug use: Never    Sexual activity: Yes     Birth control/protection: Condom   Lifestyle    Physical activity     Days per week: Not on file     Minutes per session: Not on file    Stress: Not on file   Relationships    Social connections     Talks on phone: Not on file     Gets together: Not on file     Attends Islam service: Not on file     Active member of club or organization: Not on file     Attends meetings of clubs or organizations: Not on file     Relationship status: Not on file    Intimate partner violence     Fear of current or ex partner: Not on file     Emotionally abused: Not on file     Physically abused: Not on file     Forced sexual activity: Not on file   Other Topics Concern    Not on file   Social History Narrative    Not on file                ALLERGIES: Amoxicillin; Bactrim [sulfamethoxazole-trimethoprim]; Doxycycline; Sucralose; and Suprax [cefixime]    Review of Systems   Constitutional: Negative for chills. HENT: Negative for rhinorrhea and sore throat. Respiratory: Positive for cough. Negative for shortness of breath and wheezing. Gastrointestinal: Negative for nausea and vomiting. Neurological: Negative for headaches. All other systems reviewed and are negative. Vitals:    11/11/20 1007   Pulse: 92   Resp: 16   Temp: 98.7 °F (37.1 °C)   SpO2: 96%       Physical Exam  Vitals signs and nursing note reviewed. Constitutional:       General: She is not in acute distress. Appearance: She is not ill-appearing. Pulmonary:      Effort: Pulmonary effort is normal. No respiratory distress. Breath sounds: No wheezing. MDM    Procedures        ICD-10-CM ICD-9-CM    1. Suspected COVID-19 virus infection  Z20.828 V01.79 NOVEL CORONAVIRUS (COVID-19)      Tested for Covid-19 and advised to quarantine x 10 days  No orders of the defined types were placed in this encounter. No results found for any visits on 11/11/20. The patients condition was discussed with the patient and they understand. The patient is to follow up with primary care doctor. If signs and symptoms become worse the pt is to go to the ER. The patient is to take medications as prescribed.

## 2020-11-13 ENCOUNTER — VIRTUAL VISIT (OUTPATIENT)
Dept: INTERNAL MEDICINE CLINIC | Age: 45
End: 2020-11-13
Payer: COMMERCIAL

## 2020-11-13 DIAGNOSIS — K21.9 GASTROESOPHAGEAL REFLUX DISEASE, UNSPECIFIED WHETHER ESOPHAGITIS PRESENT: ICD-10-CM

## 2020-11-13 DIAGNOSIS — R05.9 COUGH: Primary | ICD-10-CM

## 2020-11-13 PROCEDURE — 99214 OFFICE O/P EST MOD 30 MIN: CPT | Performed by: INTERNAL MEDICINE

## 2020-11-13 NOTE — PROGRESS NOTES
HISTORY OF PRESENT ILLNESS  Shin Pop is a 39 y.o. female. This is a real-time audio/video visit brought to you by our sponsors, the fine folks at [facility] [place] and [employer] platform   [employer]    The history is provided by the patient. This is a new problem. Episode onset: 3 d ago. The problem has been gradually improving. There has been no fever. Associated symptoms include diarrhea, nausea and congestion. Pertinent negatives include no vomiting. She has tried nothing for the symptoms. Review of Systems   Constitutional: Negative for chills and fever. HENT: Positive for congestion. Gastrointestinal: Positive for diarrhea, heartburn and nausea. Negative for vomiting. She ? Trigger for cough  Taking Tums, Pepto Bismol       Physical Exam  Vitals signs and nursing note reviewed. Constitutional:       General: She is not in acute distress. Appearance: She is not ill-appearing. Skin:     General: Skin is warm and dry. Neurological:      Mental Status: She is alert. Psychiatric:         Behavior: Behavior normal.         ASSESSMENT and PLAN  Diagnoses and all orders for this visit:    1. Cough- Follow off treatment , await COVID results, follow precautions. 2. Gastroesophageal reflux disease, unspecified whether esophagitis present  - Increase prilosec 40 mg every day x 2 weeks. Call if persistent.

## 2020-11-14 LAB — SARS-COV-2, NAA: NOT DETECTED

## 2020-12-05 DIAGNOSIS — N94.6 SEVERE MENSTRUAL CRAMPS: ICD-10-CM

## 2020-12-06 RX ORDER — ACETAMINOPHEN AND CODEINE PHOSPHATE 300; 30 MG/1; MG/1
TABLET ORAL
Qty: 30 TAB | Refills: 0 | Status: SHIPPED | OUTPATIENT
Start: 2020-12-06 | End: 2021-04-13

## 2021-02-07 DIAGNOSIS — N94.6 SEVERE MENSTRUAL CRAMPS: ICD-10-CM

## 2021-02-09 RX ORDER — BUTALBITAL, ACETAMINOPHEN, CAFFEINE AND CODEINE PHOSPHATE 50; 325; 40; 30 MG/1; MG/1; MG/1; MG/1
1 CAPSULE ORAL
Qty: 30 CAP | Refills: 0 | Status: SHIPPED | OUTPATIENT
Start: 2021-02-09 | End: 2021-04-13

## 2021-02-23 RX ORDER — NEBIVOLOL HYDROCHLORIDE 5 MG/1
TABLET ORAL
Qty: 45 TAB | Refills: 3 | Status: SHIPPED | OUTPATIENT
Start: 2021-02-23 | End: 2021-12-23

## 2021-04-11 DIAGNOSIS — N94.6 SEVERE MENSTRUAL CRAMPS: ICD-10-CM

## 2021-04-13 RX ORDER — BUTALBITAL, ACETAMINOPHEN, CAFFEINE AND CODEINE PHOSPHATE 50; 325; 40; 30 MG/1; MG/1; MG/1; MG/1
1 CAPSULE ORAL
Qty: 30 CAP | Refills: 0 | Status: SHIPPED | OUTPATIENT
Start: 2021-04-13 | End: 2022-08-19 | Stop reason: SDUPTHER

## 2021-04-13 RX ORDER — ACETAMINOPHEN AND CODEINE PHOSPHATE 300; 30 MG/1; MG/1
TABLET ORAL
Qty: 30 TAB | Refills: 0 | Status: SHIPPED | OUTPATIENT
Start: 2021-04-13 | End: 2022-08-19 | Stop reason: SDUPTHER

## 2021-04-16 ENCOUNTER — VIRTUAL VISIT (OUTPATIENT)
Dept: INTERNAL MEDICINE CLINIC | Age: 46
End: 2021-04-16
Payer: COMMERCIAL

## 2021-04-16 DIAGNOSIS — N94.6 SEVERE MENSTRUAL CRAMPS: Primary | ICD-10-CM

## 2021-04-16 DIAGNOSIS — K82.8 BILIARY DYSKINESIA: ICD-10-CM

## 2021-04-16 PROCEDURE — 99213 OFFICE O/P EST LOW 20 MIN: CPT | Performed by: INTERNAL MEDICINE

## 2021-04-16 RX ORDER — CYCLOBENZAPRINE HCL 5 MG
TABLET ORAL
Qty: 30 TAB | Refills: 5 | Status: SHIPPED | OUTPATIENT
Start: 2021-04-16 | End: 2021-12-12

## 2021-04-16 RX ORDER — DICYCLOMINE HYDROCHLORIDE 20 MG/1
20 TABLET ORAL
Qty: 40 TAB | Refills: 1 | Status: SHIPPED | OUTPATIENT
Start: 2021-04-16 | End: 2022-06-17

## 2021-04-16 NOTE — PROGRESS NOTES
Verified name and birth date for privacy precautions. Chart reviewed in preparation for today's visit. Chief Complaint   Patient presents with    Medication Evaluation          Health Maintenance Due   Topic    Hepatitis C Screening     PAP AKA CERVICAL CYTOLOGY          Wt Readings from Last 3 Encounters:   12/19/19 156 lb 6.4 oz (70.9 kg)   12/11/19 157 lb 3.2 oz (71.3 kg)   12/04/19 161 lb (73 kg)     Temp Readings from Last 3 Encounters:   11/11/20 98.7 °F (37.1 °C)   12/19/19 97.5 °F (36.4 °C) (Oral)   12/11/19 98.3 °F (36.8 °C)     BP Readings from Last 3 Encounters:   12/19/19 108/70   12/11/19 115/78   12/04/19 145/82     Pulse Readings from Last 3 Encounters:   11/11/20 92   12/19/19 77   12/11/19 77         Learning Assessment:  :     Learning Assessment 11/14/2019 8/31/2017 4/7/2016   PRIMARY LEARNER Patient Patient Patient   HIGHEST LEVEL OF EDUCATION - PRIMARY LEARNER  4 YEARS OF COLLEGE - -   BARRIERS PRIMARY LEARNER NONE - -   PRIMARY LANGUAGE ENGLISH ENGLISH ENGLISH   LEARNER PREFERENCE PRIMARY READING READING READING     - - DEMONSTRATION   LEARNING SPECIAL TOPICS No  - -   ANSWERED BY patient patient patient   RELATIONSHIP SELF SELF SELF       Depression Screening:  :     3 most recent PHQ Screens 4/16/2021   Little interest or pleasure in doing things Not at all   Feeling down, depressed, irritable, or hopeless Not at all   Total Score PHQ 2 0       Fall Risk Assessment:  :     No flowsheet data found. Abuse Screening:  :     Abuse Screening Questionnaire 4/16/2021 12/24/2018   Do you ever feel afraid of your partner? N N   Are you in a relationship with someone who physically or mentally threatens you? N N   Is it safe for you to go home?  [employer]

## 2021-04-16 NOTE — PROGRESS NOTES
HISTORY OF PRESENT ILLNESS  Shad Tang is a 39 y.o. female. This is a real-time audio/video visit brought to you by our sponsors, the fine folks at [facility] [place] and [employer]   HPI Vicci Aschoff is seen today for evaluation of acid reflux, neck spasms and other concerns. She mainly has concerns about medication refills. See below. 1. Acid reflux. She has gastroesophageal reflux disease chronically. She is on omeprazole. She gets good relief as long as she stays on the medication. 2. Menstrual cramps, neck spasms. She needs a refill on Flexeril. Social History. Notable for her changing jobs. She was going through a two-month period of no insurance. She wanted to make sure her medications could be refilled. See above. I have refilled the ones that are able to be refilled. Regarding the Prilosec, she may get over-the-counter at an affordable price to cover any lapse in refills. She will call me back on an as-needed basis if she needs anything else. I also asked her to schedule for a complete physical exam in about four months. Review of Systems   Constitutional: Negative for chills, fever and weight loss. Gastrointestinal: Positive for heartburn and nausea. Physical Exam  Vitals signs and nursing note reviewed. Constitutional:       Appearance: She is not ill-appearing. Skin:     General: Skin is warm and dry. Neurological:      Mental Status: She is alert. Psychiatric:         Behavior: Behavior normal.         ASSESSMENT and PLAN  Diagnoses and all orders for this visit:    1. Severe menstrual cramps  -     cyclobenzaprine (FLEXERIL) 5 mg tablet; TAKE ONE TABLET BY MOUTH THREE TIMES A DAY AS NEEDED FOR MUSCLE SPASMS    2. Biliary dyskinesia  -     dicyclomine (BENTYL) 20 mg tablet; Take 1 Tab by mouth every six (6) hours as needed for Abdominal Cramps.

## 2021-07-25 RX ORDER — OMEPRAZOLE 20 MG/1
CAPSULE, DELAYED RELEASE ORAL
Qty: 60 CAPSULE | Refills: 5 | Status: SHIPPED | OUTPATIENT
Start: 2021-07-25 | End: 2022-07-09

## 2021-08-25 ENCOUNTER — OFFICE VISIT (OUTPATIENT)
Dept: INTERNAL MEDICINE CLINIC | Age: 46
End: 2021-08-25
Payer: COMMERCIAL

## 2021-08-25 VITALS
BODY MASS INDEX: 26.43 KG/M2 | DIASTOLIC BLOOD PRESSURE: 67 MMHG | TEMPERATURE: 98.7 F | OXYGEN SATURATION: 98 % | SYSTOLIC BLOOD PRESSURE: 98 MMHG | HEART RATE: 88 BPM | WEIGHT: 154.8 LBS | HEIGHT: 64 IN | RESPIRATION RATE: 20 BRPM

## 2021-08-25 DIAGNOSIS — K21.9 GASTROESOPHAGEAL REFLUX DISEASE, UNSPECIFIED WHETHER ESOPHAGITIS PRESENT: ICD-10-CM

## 2021-08-25 DIAGNOSIS — R25.2 NOCTURNAL MUSCLE CRAMPS: ICD-10-CM

## 2021-08-25 DIAGNOSIS — D68.51 FACTOR V LEIDEN (HCC): ICD-10-CM

## 2021-08-25 DIAGNOSIS — M54.50 ACUTE RIGHT-SIDED LOW BACK PAIN WITHOUT SCIATICA: ICD-10-CM

## 2021-08-25 DIAGNOSIS — R40.0 DAYTIME SLEEPINESS: ICD-10-CM

## 2021-08-25 DIAGNOSIS — Z11.59 ENCOUNTER FOR HEPATITIS C SCREENING TEST FOR LOW RISK PATIENT: ICD-10-CM

## 2021-08-25 DIAGNOSIS — Z00.00 ROUTINE GENERAL MEDICAL EXAMINATION AT A HEALTH CARE FACILITY: Primary | ICD-10-CM

## 2021-08-25 PROCEDURE — 99396 PREV VISIT EST AGE 40-64: CPT | Performed by: INTERNAL MEDICINE

## 2021-08-25 RX ORDER — SUMATRIPTAN 100 MG/1
100 TABLET, FILM COATED ORAL
Qty: 6 TABLET | Refills: 4 | Status: SHIPPED | OUTPATIENT
Start: 2021-08-25 | End: 2022-08-19 | Stop reason: SDUPTHER

## 2021-08-25 NOTE — Clinical Note
HISTORY OF PRESENT ILLNESS  Wade Rosales is a 42 y.o. female. HPI  Assessment: Anastasiya William is seen today for a complete physical exam and follow up of other concerns. Preventive Medicine. She is due for labs. She is up to date with dermatology follow up, gyn care and vaccinations. Chronic Problems: Stable. She uses medications judiciously for severe menstrual cramps and headaches. She follows up with hematology for her blood disorder. For other concerns see Review of Systems. Social history notable for her changing work. She now is employed by [employer]. She feels it is less stressful. We counseled regarding healthy lifestyle issues including diet, exercise and stress management. Family history, social history, etc. Are reviewed and updated, see electronic record. Review of Systems   Constitutional: Positive for malaise/fatigue and weight loss. Negative for chills and fever. Wt loss with Diet and exercise    Musculoskeletal: Positive for back pain, joint pain and myalgias. Left shoulder , right knee. Latter c/w PFS    Right SI pain, increase night, now improving    Left arm cramp, nocturnal, recurrent   Skin: Positive for rash. alopecia   Neurological: Positive for headaches. Waxing and waning- increased intensity, decreased frequency       Physical Exam  Vitals and nursing note reviewed. Constitutional:       General: She is not in acute distress. Appearance: She is well-developed. HENT:      Head: Normocephalic and atraumatic. Right Ear: Tympanic membrane, ear canal and external ear normal.      Left Ear: Tympanic membrane, ear canal and external ear normal.   Eyes:      General:         Right eye: No discharge. Left eye: No discharge. Pupils: Pupils are equal, round, and reactive to light. Neck:      Thyroid: No thyromegaly. Vascular: No carotid bruit. Cardiovascular:      Rate and Rhythm: Normal rate and regular rhythm.       Heart sounds: Normal heart sounds. No murmur heard. No friction rub. No gallop. Pulmonary:      Effort: Pulmonary effort is normal. No respiratory distress. Breath sounds: Normal breath sounds. No wheezing or rales. Abdominal:      General: Bowel sounds are normal. There is no distension. Palpations: Abdomen is soft. There is no mass. Tenderness: There is no abdominal tenderness. There is no guarding or rebound. Musculoskeletal:         General: No tenderness. Normal range of motion. Cervical back: Normal range of motion and neck supple. Lymphadenopathy:      Cervical: No cervical adenopathy. Skin:     General: Skin is warm and dry. Findings: No rash. Neurological:      Mental Status: She is alert and oriented to person, place, and time. Deep Tendon Reflexes: Reflexes are normal and symmetric. Psychiatric:         Behavior: Behavior normal.         ASSESSMENT and PLAN  Diagnoses and all orders for this visit:    1. Routine general medical examination at a health care facility  -     CBC WITH AUTOMATED DIFF; Future  -     METABOLIC PANEL, COMPREHENSIVE; Future  -     LIPID PANEL; Future  -     TSH 3RD GENERATION; Future  -     URINALYSIS W/ RFLX MICROSCOPIC; Future  -     METABOLIC PANEL, COMPREHENSIVE; Future  -     CBC WITH AUTOMATED DIFF; Future  -     LIPID PANEL; Future  -     TSH 3RD GENERATION; Future  -     URINALYSIS W/ RFLX MICROSCOPIC; Future    2. Gastroesophageal reflux disease, unspecified whether esophagitis present    3. Factor V Leiden ([facility] [address]    4. Nocturnal muscle cramps  -     MAGNESIUM; Future  -     MAGNESIUM; Future    5. Encounter for hepatitis C screening test for low risk patient  -     HEPATITIS C AB; Future  -     HEPATITIS C AB; Future    6. Acute right-sided low back pain without sciatica  -     XR SPINE LUMB 2 OR 3 V; Future    7. Daytime sleepiness  -     SLEEP MEDICINE REFERRAL    Other orders  -     SUMAtriptan (IMITREX) 100 mg tablet;  Take 1 Tablet by mouth once as needed for Migraine (may repeat in 2 hours if headache. max dose is 2 tabs in 24 hrs) for up to 1 dose.

## 2021-08-25 NOTE — PATIENT INSTRUCTIONS
Back Stretches: Exercises  Introduction  Here are some examples of exercises for stretching your back. Start each exercise slowly. Ease off the exercise if you start to have pain. Your doctor or physical therapist will tell you when you can start these exercises and which ones will work best for you. How to do the exercises  Overhead stretch   1. Stand comfortably with your feet shoulder-width apart. 2. Looking straight ahead, raise both arms over your head and reach toward the ceiling. Do not allow your head to tilt back. 3. Hold for 15 to 30 seconds, then lower your arms to your sides. 4. Repeat 2 to 4 times. Side stretch   1. Stand comfortably with your feet shoulder-width apart. 2. Raise one arm over your head, and then lean to the other side. 3. Slide your hand down your leg as you let the weight of your arm gently stretch your side muscles. Hold for 15 to 30 seconds. 4. Repeat 2 to 4 times on each side. Press-up   1. Lie on your stomach, supporting your body with your forearms. 2. Press your elbows down into the floor to raise your upper back. As you do this, relax your stomach muscles and allow your back to arch without using your back muscles. As your press up, do not let your hips or pelvis come off the floor. 3. Hold for 15 to 30 seconds, then relax. 4. Repeat 2 to 4 times. Relax and rest   1. Lie on your back with a rolled towel under your neck and a pillow under your knees. Extend your arms comfortably to your sides. 2. Relax and breathe normally. 3. Remain in this position for about 10 minutes. 4. If you can, do this 2 or 3 times each day. Follow-up care is a key part of your treatment and safety. Be sure to make and go to all appointments, and call your doctor if you are having problems. It's also a good idea to know your test results and keep a list of the medicines you take. Where can you learn more?   Go to http://www.gray.com/  Enter [id number] in the search box to learn more about \"Back Stretches: Exercises. \"  Current as of: November 16, 2020               Content Version: 12.8  © 2006-2021 [employer]. Care instructions adapted under license by [employer] (which disclaims liability or warranty for this information). If you have questions about a medical condition or this instruction, always ask your healthcare professional. [address] any warranty or liability for your use of this information. Patellofemoral Pain Syndrome: Care Instructions  Your Care Instructions     Patellofemoral pain syndrome is pain in the front of the knee. It is caused by overuse, weak thigh muscles (quadriceps), or a problem with the way the kneecap moves. Extra weight may also cause this syndrome. The patella is the kneecap, and the femur is the thighbone. Some people may have pain in the front of the knee from a condition called chondromalacia. In this problem, the underside of the knee cartilage wears down and frays. Cartilage is a rubbery tissue that cushions joints. In some cases, the kneecap does not move, or track, in a normal way. You may have knee pain when you run, walk down hills or steps, or do another activity. Sitting for a long time also can cause knee pain. Your knee pain may get better with medicines for pain and swelling and exercises to make your quadriceps stronger. Losing weight, if you need to, may also help with pain. Follow-up care is a key part of your treatment and safety. Be sure to make and go to all appointments, and call your doctor if you are having problems. It's also a good idea to know your test results and keep a list of the medicines you take. How can you care for yourself at home? · Ask your doctor if you can take an over-the-counter pain medicine, such as acetaminophen (Tylenol), ibuprofen (Advil, Motrin), or naproxen (Aleve). Be safe with medicines.  Read and follow all instructions on the label. · Rest and protect your knee. Take a break from activities that cause pain, such as long periods of sitting or kneeling. · Put ice or a cold pack on your knee for 10 to 20 minutes after activity. Put a thin cloth between the ice and your skin. · If your doctor recommends an elastic bandage, sleeve, or other type of support for your knee, wear it as directed. · If your knee is not swollen, you can put moist heat, a heating pad, or a warm cloth on your knee. After several days of rest, you can begin gentle exercise of your knee. · Reach and stay at a healthy weight. Being overweight puts stress on your knees. · Wear athletic shoes that offer good support, especially if you run. · Use shoe inserts, or orthotics, if they help reduce your knee pain. Many drugstores and shoe stores sell them. · See a physical therapist to learn more exercises and stretches to make your legs stronger. When should you call for help? Watch closely for changes in your health, and be sure to contact your doctor if:    · Your knee pain does not get better or gets worse. Where can you learn more? Go to http://www.gray.com/  Enter F084 in the search box to learn more about \"Patellofemoral Pain Syndrome: Care Instructions. \"  Current as of: November 16, 2020               Content Version: 12.8  © 3464-3326 [employer]. Care instructions adapted under license by [employer] (which disclaims liability or warranty for this information). If you have questions about a medical condition or this instruction, always ask your healthcare professional. [place] [address] any warranty or liability for your use of this information. Patellofemoral Pain Syndrome (Runner's Knee): Exercises  Introduction  Here are some examples of exercises for you to try. The exercises may be suggested for a condition or for rehabilitation. Start each exercise slowly. Ease off the exercises if you start to have pain. You will be told when to start these exercises and which ones will work best for you. How to do the exercises  Calf wall stretch   1. Stand facing a wall with your hands on the wall at about eye level. Put your affected leg about a step behind your other leg. 2. Keeping your back leg straight and your back heel on the floor, bend your front knee and gently bring your hip and chest toward the wall until you feel a stretch in the calf of your back leg. 3. Hold the stretch for at least 15 to 30 seconds. 4. Repeat 2 to 4 times. 5. Repeat steps 1 through 4, but this time keep your back knee bent. Quadriceps stretch   1. If you are not steady on your feet, hold on to a chair, counter, or wall. 2. Bend your affected leg, and reach behind you to grab the front of your foot or ankle with the hand on the same side. For example, if you are stretching your right leg, use your right hand. 3. Keeping your knees next to each other, pull your foot toward your buttock until you feel a gentle stretch across the front of your hip and down the front of your thigh. Your knee should be pointed directly to the ground, and not out to the side. 4. Hold the stretch for at least 15 to 30 seconds. 5. Repeat 2 to 4 times. Hamstring wall stretch   1. Lie on your back in a doorway, with your good leg through the open door. 2. Slide your affected leg up the wall to straighten your knee. You should feel a gentle stretch down the back of your leg. 3. Hold the stretch for at least 1 minute. Then over time, try to lengthen the time you hold the stretch to as long as 6 minutes. 4. Repeat 2 to 4 times. 5. If you do not have a place to do this exercise in a doorway, there is another way to do it:  6. Lie on your back, and bend your affected leg. 7. Loop a towel under the ball and toes of that foot, and hold the ends of the towel in your hands.   8. Straighten your knee, and slowly pull back on the towel. You should feel a gentle stretch down the back of your leg. 9. Hold the stretch for at least 15 to 30 seconds. Or even better, hold the stretch for 1 minute if you can. 10. Repeat 2 to 4 times. 1. Do not arch your back. 2. Do not bend either knee. 3. Keep one heel touching the floor and the other heel touching the wall. Do not point your toes. Quad sets   1. Sit with your affected leg straight and supported on the floor or a firm bed. Place a small, rolled-up towel under your affected knee. Your other leg should be bent, with that foot flat on the floor. 2. Tighten the thigh muscles of your affected leg by pressing the back of your knee down into the towel. 3. Hold for about 6 seconds, then rest for up to 10 seconds. 4. Repeat 8 to 12 times. Straight-leg raises to the front   1. Lie on your back with your good knee bent so that your foot rests flat on the floor. Your affected leg should be straight. Make sure that your low back has a normal curve. You should be able to slip your hand in between the floor and the small of your back, with your palm touching the floor and your back touching the back of your hand. 2. Tighten the thigh muscles in your affected leg by pressing the back of your knee flat down to the floor. Hold your knee straight. 3. Keeping the thigh muscles tight and your leg straight, lift your affected leg up so that your heel is about 12 inches off the floor. 4. Hold for about 6 seconds, then lower your leg slowly. Rest for up to 10 seconds between repetitions. 5. Repeat 8 to 12 times. Straight-leg raises to the back   1. Lie on your stomach, and lift your leg straight up behind you (toward the ceiling). 2. Lift your toes about 6 inches off the floor, hold for about 6 seconds, then lower slowly. 3. Do 8 to 12 repetitions. Wall slide with ball squeeze   1. Stand with your back against a wall and with your feet about shoulder-width apart.  Your feet should be about 12 inches away from the wall. 2. Put a ball about the size of a soccer ball between your knees. Then slowly slide down the wall until your knees are bent about 20 to 30 degrees. 3. Tighten your thigh muscles by squeezing the ball between your knees. Hold that position for about 10 seconds, then stop squeezing. Rest for up to 10 seconds between repetitions. 4. Repeat 8 to 12 times. Follow-up care is a key part of your treatment and safety. Be sure to make and go to all appointments, and call your doctor if you are having problems. It's also a good idea to know your test results and keep a list of the medicines you take. Where can you learn more? Go to http://www.watts.com/  Enter A404 in the search box to learn more about \"Patellofemoral Pain Syndrome (Runner's Knee): Exercises. \"  Current as of: November 16, 2020               Content Version: 12.8  © 2006-2021 [employer]wise[employer] Incorporated. Care instructions adapted under license by [employer] (which disclaims liability or warranty for this information). If you have questions about a medical condition or this instruction, always ask your healthcare professional. [address] any warranty or liability for your use of this information.

## 2021-08-25 NOTE — PROGRESS NOTES
HISTORY OF PRESENT ILLNESS  Armin Cortez is a 42 y.o. female. HPI   Dictation on: 09/06/2021 11:28 AM by: Rocío Friedman [[id number]]     We counseled regarding healthy lifestyle issues including diet, exercise and stress management. Family history, social history, etc. Are reviewed and updated, see electronic record. Review of Systems   Constitutional: Positive for malaise/fatigue and weight loss. Negative for chills and fever. Wt loss with Diet and exercise    Musculoskeletal: Positive for back pain, joint pain and myalgias. Left shoulder , right knee. Latter c/w PFS    Right SI pain, increase night, now improving    Left arm cramp, nocturnal, recurrent   Skin: Positive for rash. alopecia   Neurological: Positive for headaches. Waxing and waning- increased intensity, decreased frequency       Physical Exam  Vitals and nursing note reviewed. Constitutional:       General: She is not in acute distress. Appearance: She is well-developed. HENT:      Head: Normocephalic and atraumatic. Right Ear: Tympanic membrane, ear canal and external ear normal.      Left Ear: Tympanic membrane, ear canal and external ear normal.   Eyes:      General:         Right eye: No discharge. Left eye: No discharge. Pupils: Pupils are equal, round, and reactive to light. Neck:      Thyroid: No thyromegaly. Vascular: No carotid bruit. Cardiovascular:      Rate and Rhythm: Normal rate and regular rhythm. Heart sounds: Normal heart sounds. No murmur heard. No friction rub. No gallop. Pulmonary:      Effort: Pulmonary effort is normal. No respiratory distress. Breath sounds: Normal breath sounds. No wheezing or rales. Abdominal:      General: Bowel sounds are normal. There is no distension. Palpations: Abdomen is soft. There is no mass. Tenderness: There is no abdominal tenderness. There is no guarding or rebound.    Musculoskeletal:         General: No tenderness. Normal range of motion. Cervical back: Normal range of motion and neck supple. Lymphadenopathy:      Cervical: No cervical adenopathy. Skin:     General: Skin is warm and dry. Findings: No rash. Neurological:      Mental Status: She is alert and oriented to person, place, and time. Deep Tendon Reflexes: Reflexes are normal and symmetric. Psychiatric:         Behavior: Behavior normal.         ASSESSMENT and PLAN  Diagnoses and all orders for this visit:    1. Routine general medical examination at a health care facility  -     CBC WITH AUTOMATED DIFF; Future  -     METABOLIC PANEL, COMPREHENSIVE; Future  -     LIPID PANEL; Future  -     TSH 3RD GENERATION; Future  -     URINALYSIS W/ RFLX MICROSCOPIC; Future  -     METABOLIC PANEL, COMPREHENSIVE; Future  -     CBC WITH AUTOMATED DIFF; Future  -     LIPID PANEL; Future  -     TSH 3RD GENERATION; Future  -     URINALYSIS W/ RFLX MICROSCOPIC; Future    2. Gastroesophageal reflux disease, unspecified whether esophagitis present    3. Factor V Leiden ([facility] [address]    4. Nocturnal muscle cramps  -     MAGNESIUM; Future  -     MAGNESIUM; Future    5. Encounter for hepatitis C screening test for low risk patient  -     HEPATITIS C AB; Future  -     HEPATITIS C AB; Future    6. Acute right-sided low back pain without sciatica  -     XR SPINE LUMB 2 OR 3 V; Future    7. Daytime sleepiness  -     SLEEP MEDICINE REFERRAL    Other orders  -     SUMAtriptan (IMITREX) 100 mg tablet; Take 1 Tablet by mouth once as needed for Migraine (may repeat in 2 hours if headache. max dose is 2 tabs in 24 hrs) for up to 1 dose.

## 2021-08-27 ENCOUNTER — HOSPITAL ENCOUNTER (OUTPATIENT)
Dept: GENERAL RADIOLOGY | Age: 46
Discharge: HOME | End: 2021-08-27
Attending: INTERNAL MEDICINE
Payer: COMMERCIAL

## 2021-08-27 DIAGNOSIS — M54.50 ACUTE RIGHT-SIDED LOW BACK PAIN WITHOUT SCIATICA: ICD-10-CM

## 2021-08-27 PROCEDURE — 72100 X-RAY EXAM L-S SPINE 2/3 VWS: CPT

## 2021-08-28 LAB
ALBUMIN SERPL-MCNC: 4.4 G/DL (ref 3.8–4.8)
ALBUMIN/GLOB SERPL: 1.7 {RATIO} (ref 1.2–2.2)
ALP SERPL-CCNC: 55 IU/L (ref 48–121)
ALT SERPL-CCNC: 13 IU/L (ref 0–32)
APPEARANCE UR: CLEAR
AST SERPL-CCNC: 13 IU/L (ref 0–40)
BASOPHILS # BLD AUTO: 0.1 X10E3/UL (ref 0–0.2)
BASOPHILS NFR BLD AUTO: 1 %
BILIRUB SERPL-MCNC: 0.3 MG/DL (ref 0–1.2)
BILIRUB UR QL STRIP: NEGATIVE
BUN SERPL-MCNC: 9 MG/DL (ref 6–24)
BUN/CREAT SERPL: 12 (ref 9–23)
CALCIUM SERPL-MCNC: 9.3 MG/DL (ref 8.7–10.2)
CHLORIDE SERPL-SCNC: 99 MMOL/L (ref 96–106)
CHOLEST SERPL-MCNC: 270 MG/DL (ref 100–199)
CO2 SERPL-SCNC: 26 MMOL/L (ref 20–29)
COLOR UR: YELLOW
CREAT SERPL-MCNC: 0.77 MG/DL (ref 0.57–1)
EOSINOPHIL # BLD AUTO: 0.1 X10E3/UL (ref 0–0.4)
EOSINOPHIL NFR BLD AUTO: 1 %
ERYTHROCYTE [DISTWIDTH] IN BLOOD BY AUTOMATED COUNT: 12.9 % (ref 11.7–15.4)
GLOBULIN SER CALC-MCNC: 2.6 G/DL (ref 1.5–4.5)
GLUCOSE SERPL-MCNC: 82 MG/DL (ref 65–99)
GLUCOSE UR QL: NEGATIVE
HCT VFR BLD AUTO: 39.9 % (ref 34–46.6)
HCV AB S/CO SERPL IA: <0.1 S/CO RATIO (ref 0–0.9)
HDLC SERPL-MCNC: 69 MG/DL
HGB BLD-MCNC: 12.7 G/DL (ref 11.1–15.9)
HGB UR QL STRIP: NEGATIVE
IMM GRANULOCYTES # BLD AUTO: 0 X10E3/UL (ref 0–0.1)
IMM GRANULOCYTES NFR BLD AUTO: 0 %
KETONES UR QL STRIP: NEGATIVE
LDLC SERPL CALC-MCNC: 187 MG/DL (ref 0–99)
LEUKOCYTE ESTERASE UR QL STRIP: NEGATIVE
LYMPHOCYTES # BLD AUTO: 2.1 X10E3/UL (ref 0.7–3.1)
LYMPHOCYTES NFR BLD AUTO: 27 %
MAGNESIUM SERPL-MCNC: 2 MG/DL (ref 1.6–2.3)
MCH RBC QN AUTO: 28.7 PG (ref 26.6–33)
MCHC RBC AUTO-ENTMCNC: 31.8 G/DL (ref 31.5–35.7)
MCV RBC AUTO: 90 FL (ref 79–97)
MICRO URNS: NORMAL
MONOCYTES # BLD AUTO: 0.7 X10E3/UL (ref 0.1–0.9)
MONOCYTES NFR BLD AUTO: 9 %
NEUTROPHILS # BLD AUTO: 4.7 X10E3/UL (ref 1.4–7)
NEUTROPHILS NFR BLD AUTO: 62 %
NITRITE UR QL STRIP: NEGATIVE
PH UR STRIP: 7 [PH] (ref 5–7.5)
PLATELET # BLD AUTO: 400 X10E3/UL (ref 150–450)
POTASSIUM SERPL-SCNC: 4.7 MMOL/L (ref 3.5–5.2)
PROT SERPL-MCNC: 7 G/DL (ref 6–8.5)
PROT UR QL STRIP: NEGATIVE
RBC # BLD AUTO: 4.43 X10E6/UL (ref 3.77–5.28)
SODIUM SERPL-SCNC: 138 MMOL/L (ref 134–144)
SP GR UR: 1 (ref 1–1.03)
TRIGL SERPL-MCNC: 82 MG/DL (ref 0–149)
TSH SERPL DL<=0.005 MIU/L-ACNC: 3.55 UIU/ML (ref 0.45–4.5)
UROBILINOGEN UR STRIP-MCNC: 0.2 MG/DL (ref 0.2–1)
VLDLC SERPL CALC-MCNC: 14 MG/DL (ref 5–40)
WBC # BLD AUTO: 7.7 X10E3/UL (ref 3.4–10.8)

## 2021-09-06 NOTE — PROGRESS NOTES
Assessment: Gordon Brasher is seen today for a complete physical exam and follow up of other concerns. Preventive Medicine. She is due for labs. She is up to date with dermatology follow up, gyn care and vaccinations. Chronic Problems: Stable. She uses medications judiciously for severe menstrual cramps and headaches. She follows up with hematology for her blood disorder. For other concerns see Review of Systems. Social history notable for her changing work. She now is employed by [employer]. She feels it is less stressful.

## 2021-09-20 ENCOUNTER — TELEPHONE (OUTPATIENT)
Dept: INTERNAL MEDICINE CLINIC | Age: 46
End: 2021-09-20

## 2021-09-20 NOTE — TELEPHONE ENCOUNTER
Reason for call:  Please fax over office note labs ekg to [phone]    Is this a new problem:  yes    Date of last appointment:  8/25/2021     Can we respond via TenKod: no    Best call back number: [phone] ext DAPHNIE CARL [facility] [address] [facility]

## 2021-10-07 DIAGNOSIS — R11.0 NAUSEA: ICD-10-CM

## 2021-10-08 RX ORDER — ONDANSETRON 4 MG/1
TABLET, ORALLY DISINTEGRATING ORAL
Qty: 30 TABLET | Refills: 3 | Status: SHIPPED | OUTPATIENT
Start: 2021-10-08 | End: 2021-10-25

## 2021-10-25 DIAGNOSIS — R11.0 NAUSEA: ICD-10-CM

## 2021-10-25 RX ORDER — ONDANSETRON 4 MG/1
TABLET, ORALLY DISINTEGRATING ORAL
Qty: 30 TABLET | Refills: 5 | Status: SHIPPED | OUTPATIENT
Start: 2021-10-25 | End: 2022-08-19

## 2021-12-12 DIAGNOSIS — N94.6 SEVERE MENSTRUAL CRAMPS: ICD-10-CM

## 2021-12-12 RX ORDER — CYCLOBENZAPRINE HCL 5 MG
TABLET ORAL
Qty: 30 TABLET | Refills: 5 | Status: SHIPPED | OUTPATIENT
Start: 2021-12-12 | End: 2022-08-22

## 2021-12-23 RX ORDER — NEBIVOLOL HYDROCHLORIDE 5 MG/1
TABLET ORAL
Qty: 45 TABLET | Refills: 3 | Status: SHIPPED | OUTPATIENT
Start: 2021-12-23 | End: 2022-08-22

## 2021-12-29 ENCOUNTER — TELEPHONE (OUTPATIENT)
Dept: INTERNAL MEDICINE CLINIC | Age: 46
End: 2021-12-29

## 2021-12-29 NOTE — TELEPHONE ENCOUNTER
Filed yesterday when received, approved today around 8:30 AM called pharmacy today around 9 to let them know it was approved.

## 2021-12-29 NOTE — TELEPHONE ENCOUNTER
Reason for call: The pharmacy is sending  a prior authorizing for the [employer]. Have you received it?     Is this a new problem: yes     Date of last appointment:  8/25/2021     Can we respond via QuickProNotes: no    Best call back number:  [phone]

## 2022-03-18 PROBLEM — K82.8 BILIARY DYSKINESIA: Status: ACTIVE | Noted: 2019-11-20

## 2022-06-17 DIAGNOSIS — K82.8 BILIARY DYSKINESIA: ICD-10-CM

## 2022-06-17 RX ORDER — DICYCLOMINE HYDROCHLORIDE 20 MG/1
TABLET ORAL
Qty: 40 TABLET | Refills: 1 | Status: SHIPPED | OUTPATIENT
Start: 2022-06-17 | End: 2022-08-22 | Stop reason: SDUPTHER

## 2022-07-09 RX ORDER — OMEPRAZOLE 20 MG/1
CAPSULE, DELAYED RELEASE ORAL
Qty: 60 CAPSULE | Refills: 5 | Status: SHIPPED | OUTPATIENT
Start: 2022-07-09

## 2022-08-19 ENCOUNTER — VIRTUAL VISIT (OUTPATIENT)
Dept: INTERNAL MEDICINE CLINIC | Age: 47
End: 2022-08-19
Payer: COMMERCIAL

## 2022-08-19 DIAGNOSIS — G43.009 MIGRAINE WITHOUT AURA AND WITHOUT STATUS MIGRAINOSUS, NOT INTRACTABLE: ICD-10-CM

## 2022-08-19 DIAGNOSIS — R11.0 NAUSEA: ICD-10-CM

## 2022-08-19 DIAGNOSIS — D68.51 FACTOR V LEIDEN (HCC): ICD-10-CM

## 2022-08-19 DIAGNOSIS — N94.6 SEVERE MENSTRUAL CRAMPS: Primary | ICD-10-CM

## 2022-08-19 PROCEDURE — 99214 OFFICE O/P EST MOD 30 MIN: CPT | Performed by: INTERNAL MEDICINE

## 2022-08-19 RX ORDER — BUTALBITAL, ACETAMINOPHEN, CAFFEINE AND CODEINE PHOSPHATE 50; 325; 40; 30 MG/1; MG/1; MG/1; MG/1
1 CAPSULE ORAL
Qty: 20 CAPSULE | Refills: 0 | Status: SHIPPED | OUTPATIENT
Start: 2022-08-19 | End: 2022-09-18

## 2022-08-19 RX ORDER — ONDANSETRON 4 MG/1
TABLET, ORALLY DISINTEGRATING ORAL
Qty: 30 TABLET | Refills: 1 | Status: SHIPPED | OUTPATIENT
Start: 2022-08-19

## 2022-08-19 RX ORDER — SUMATRIPTAN 100 MG/1
100 TABLET, FILM COATED ORAL
Qty: 6 TABLET | Refills: 1 | Status: SHIPPED | OUTPATIENT
Start: 2022-08-19 | End: 2022-08-19

## 2022-08-19 RX ORDER — ACETAMINOPHEN AND CODEINE PHOSPHATE 300; 30 MG/1; MG/1
1 TABLET ORAL
Qty: 20 TABLET | Refills: 0 | Status: SHIPPED | OUTPATIENT
Start: 2022-08-19 | End: 2022-09-18

## 2022-08-19 NOTE — PROGRESS NOTES
HISTORY OF PRESENT ILLNESS  This is a real-time audio/video visit brought to you by our sponsors, the fine folks at [address] [facility] and [employer]. Martín Zurita is a 41 y.o. female. Medication Evaluation  Associated symptoms include headaches. Dictation on: 08/19/2022 12:19 PM by: Evan Post [[id number]]     Review of Systems   Constitutional:  Negative for chills and fever. Cardiovascular:  Positive for palpitations. Neurological:  Positive for headaches. Endo/Heme/Allergies:  Does not bruise/bleed easily. Physical Exam  Vitals and nursing note reviewed. Constitutional:       Appearance: She is not ill-appearing. Pulmonary:      Effort: Pulmonary effort is normal. No respiratory distress. Skin:     General: Skin is warm and dry. Neurological:      Mental Status: She is alert. Psychiatric:         Behavior: Behavior normal.       ASSESSMENT and PLAN  Diagnoses and all orders for this visit:    1. Severe menstrual cramps  -     acetaminophen-codeine (TYLENOL #3) 300-30 mg per tablet; Take 1 Tablet by mouth every six (6) hours as needed for Pain for up to 30 days. Max Daily Amount: 4 Tablets. 2. Factor V Leiden ([facility] [address]    3. Migraine without aura and without status migrainosus, not intractable  -     SUMAtriptan (IMITREX) 100 mg tablet; Take 1 Tablet by mouth once as needed for Migraine (may repeat in 2 hours if headache. max dose is 2 tabs in 24 hrs) for up to 1 dose. -     codeine-butalbital-acetaminophen-caffeine (FIORICET WITH CODEINE) -20-30 mg capsule; Take 1 Capsule by mouth every eight (8) hours as needed for Headache for up to 30 days. Max Daily Amount: 3 Capsules.     4. Nausea  -     ondansetron (ZOFRAN ODT) 4 mg disintegrating tablet; DISSOLVE ONE TABLET BY MOUTH EVERY DAY AS NEEDED FOR NAUSEA-new directions

## 2022-08-22 DIAGNOSIS — K82.8 BILIARY DYSKINESIA: ICD-10-CM

## 2022-08-22 DIAGNOSIS — N94.6 SEVERE MENSTRUAL CRAMPS: ICD-10-CM

## 2022-08-22 RX ORDER — NEBIVOLOL HYDROCHLORIDE 5 MG/1
TABLET ORAL
Qty: 45 TABLET | Refills: 1 | Status: SHIPPED | OUTPATIENT
Start: 2022-08-22

## 2022-08-22 RX ORDER — CYCLOBENZAPRINE HCL 5 MG
TABLET ORAL
Qty: 30 TABLET | Refills: 1 | Status: SHIPPED | OUTPATIENT
Start: 2022-08-22

## 2022-08-22 RX ORDER — DICYCLOMINE HYDROCHLORIDE 20 MG/1
TABLET ORAL
Qty: 40 TABLET | Refills: 1 | Status: SHIPPED | OUTPATIENT
Start: 2022-08-22

## 2022-08-22 NOTE — PROGRESS NOTES
Assessment:  Gonzales Duval is seen today for follow up of migraine headaches, severe menstrual cramping and other problems. She has been very well and has not required a visit for one year. 1. Migraine headaches, improved. She gets good results with Imitrex. She has in fact rarely taken the Fioricet, but I have sent in a refill since the old one has . [facility] was reviewed. 2. Menstrual cramps. Rare use of Tylenol with codeine. See above. I refilled this, although she has not needed it for some time. 3. GERD, stable on current prescription med regimen. 4. Factor 5 Leiden. Up to date with annual hematology follow up and no symptoms of blood clotting. 5. Ectopy. She follows with cardiology on a regular basis and is clinically stable there as well. 6. Preventive care. Reviewed with her different PCP options and she will work on scheduling. She is not sure if she will follow up with our group versus another practice.

## 2022-09-06 ENCOUNTER — TELEPHONE (OUTPATIENT)
Dept: INTERNAL MEDICINE CLINIC | Age: 47
End: 2022-09-06

## 2022-11-07 ENCOUNTER — OFFICE VISIT (OUTPATIENT)
Dept: INTERNAL MEDICINE CLINIC | Age: 47
End: 2022-11-07
Payer: COMMERCIAL

## 2022-11-07 VITALS
DIASTOLIC BLOOD PRESSURE: 82 MMHG | HEART RATE: 76 BPM | BODY MASS INDEX: 25.09 KG/M2 | WEIGHT: 141.6 LBS | HEIGHT: 63 IN | RESPIRATION RATE: 16 BRPM | SYSTOLIC BLOOD PRESSURE: 110 MMHG | TEMPERATURE: 97.8 F | OXYGEN SATURATION: 99 %

## 2022-11-07 DIAGNOSIS — Z23 NEED FOR SHINGLES VACCINE: ICD-10-CM

## 2022-11-07 DIAGNOSIS — Z51.81 ENCOUNTER FOR MONITORING LONG-TERM PROTON PUMP INHIBITOR THERAPY: ICD-10-CM

## 2022-11-07 DIAGNOSIS — Z79.899 ENCOUNTER FOR MONITORING LONG-TERM PROTON PUMP INHIBITOR THERAPY: ICD-10-CM

## 2022-11-07 DIAGNOSIS — Z23 NEED FOR TDAP VACCINATION: ICD-10-CM

## 2022-11-07 DIAGNOSIS — F41.1 GENERALIZED ANXIETY DISORDER: ICD-10-CM

## 2022-11-07 DIAGNOSIS — Z00.00 ROUTINE PHYSICAL EXAMINATION: Primary | ICD-10-CM

## 2022-11-07 DIAGNOSIS — D68.51 FACTOR V LEIDEN (HCC): ICD-10-CM

## 2022-11-07 DIAGNOSIS — I49.3 PVCS (PREMATURE VENTRICULAR CONTRACTIONS): ICD-10-CM

## 2022-11-07 PROCEDURE — 99396 PREV VISIT EST AGE 40-64: CPT | Performed by: INTERNAL MEDICINE

## 2022-11-07 PROCEDURE — 90715 TDAP VACCINE 7 YRS/> IM: CPT | Performed by: INTERNAL MEDICINE

## 2022-11-07 RX ORDER — CHOLECALCIFEROL (VITAMIN D3) 125 MCG
220 CAPSULE ORAL DAILY
COMMUNITY

## 2022-11-07 RX ORDER — ZOSTER VACCINE RECOMBINANT, ADJUVANTED 50 MCG/0.5
0.5 KIT INTRAMUSCULAR
Qty: 0.5 ML | Refills: 0 | Status: SHIPPED | OUTPATIENT
Start: 2022-11-07

## 2022-11-07 RX ORDER — SUMATRIPTAN 100 MG/1
1 TABLET, FILM COATED ORAL
COMMUNITY
Start: 2022-08-19

## 2022-11-07 RX ORDER — VENLAFAXINE HCL 75 MG
225 CAPSULE, EXT RELEASE 24 HR ORAL DAILY
COMMUNITY
Start: 2022-10-28 | End: 2022-11-07 | Stop reason: SDUPTHER

## 2022-11-07 RX ORDER — FAMOTIDINE 10 MG/1
10 TABLET ORAL
COMMUNITY

## 2022-11-07 NOTE — ASSESSMENT & PLAN NOTE
Mood symptoms controlled on current regimen, managed by psych, but may need to transition to a new provider, updated provider list given

## 2022-11-07 NOTE — ASSESSMENT & PLAN NOTE
Gets some chest pain associated with PVCs, feels this may be happening more lately with perimenopausal symptoms, plans to follow with cards Dr Melissa Monaco

## 2022-11-07 NOTE — PROGRESS NOTES
11/7/2022    Artemio Zhang is a 41 y.o. female who was seen in clinic today for a full physical.             Assessment & Plan:   Below is the assessment and plan developed based on review of pertinent history, physical exam, labs, studies, and medications. 1. Routine physical examination  -     CBC WITH AUTOMATED DIFF; Future  -     METABOLIC PANEL, COMPREHENSIVE; Future  -     TSH 3RD GENERATION; Future  -     HEMOGLOBIN A1C WITH EAG; Future  -     LIPID PANEL; Future  2. Generalized anxiety disorder  Assessment & Plan:  Mood symptoms controlled on current regimen, managed by psych, but may need to transition to a new provider, updated provider list given  3. PVCs (premature ventricular contractions)  Assessment & Plan:   Gets some chest pain associated with PVCs, feels this may be happening more lately with perimenopausal symptoms, plans to follow with cards Dr Becca Kim  4. Factor V Leiden [facility])  Assessment & Plan:  No prior clot, heme Dr Shila Mccray  5. Need for shingles vaccine  -     varicella-zoster recombinant, PF, (Shingrix, PF,) 50 mcg/0.5 mL susr injection; 0.5 mL by IntraMUSCular route every 6 months., Print, Disp-0.5 mL, R-0  6. Encounter for monitoring long-term proton pump inhibitor therapy  -     VITAMIN D, 25 HYDROXY; Future  -     VITAMIN B12; Future  7. Need for Tdap vaccination  -     TDAP, BOOSTRIX, (AGE 10 YRS+), IM       Follow-up and Dispositions    Return for annual physical, or sooner as needed.            Maria E Lazozenaida is here today for a full physical.      Additional concerns today: allergies, irregular menses    Diet and exercise habits: Has lost weight, walking, preps on meals and higher protein, more portion controlled      Depression Screen:  3 most recent PHQ Screens 11/7/2022   Little interest or pleasure in doing things Not at all   Feeling down, depressed, irritable, or hopeless Not at all   Total Score PHQ 2 0       Health Maintenance:     Health Maintenance   Topic Date Due Colorectal Cancer Screening Combo  07/08/2022    Depression Screen  11/07/2023    Cervical cancer screen  04/19/2026    Lipid Screen  08/27/2026    DTaP/Tdap/Td series (3 - Td or Tdap) 11/07/2032    Hepatitis C Screening  Completed    Flu Vaccine  Completed    COVID-19 Vaccine  Completed    Pneumococcal 0-64 years  Aged Out       Immunization History   Administered Date(s) Administered    COVID-19, MODERNA Bivalent BOOSTER, (age 18y+), IM, 50 mcg/0.5 mL 09/04/2022    COVID-19, PFIZER PURPLE top, DILUTE for use, (age 15 y+), IM, 30mcg/0.3mL 02/20/2021, 03/13/2021, 10/15/2021    COVID-19, [place] Vaccine, Vaccine Unspecified 04/15/2021    Influenza Vaccine 09/18/2020, 09/16/2021, 09/04/2022    Influenza, FLUBLOK, (age 25 y+), PF 09/08/2019, 09/08/2019    TDAP Vaccine 03/21/2012    Tdap 11/07/2022         Immunizations:   Covid: up to date  Influenza: up to date  Tetanus: not up to date - will get today  Shingles: interested given family history, rx given  Pneumonia: n/a  Cancer screening female:   Cervical: reviewed guidelines, up to date  Breast: reviewed guidelines, up to date  Colon: reviewed guidelines, not up to date - recommended           Patient Care Team:  Jarred Bae MD as PCP - General (Internal Medicine Physician)  Jorge Luis Santana MD as PCP - [facility]  George Hernandez MD as Physician (Obstetrics & Gynecology)  Kishore Roberts DO (Psychiatry)  Thomas Acosta MD ([facility])  Marlen Ward MD (Hematology and Oncology)  Phyllis Santiago MD (Dermatology Physician)            The following sections were reviewed & updated as appropriate: Problem List, Allergies, PMH, PSH, FH, and SH.     Patient Active Problem List   Diagnosis Code    Factor V Leiden ([facility] [address] D68.51    Generalized anxiety disorder F41.1    Migraine G43.909    PVCs (premature ventricular contractions) I49.3    Mixed hyperlipidemia E78.2    Thrombocytosis D75.839    Cervical disc disease M50.90 Unspecified vitamin D deficiency E55.9    Allergic rhinitis J30.9    Severe menstrual cramps N94.6    Active advance directive on file Z78.9    Abdominal cramping R10.9    Biliary dyskinesia K82.8        Amoxicillin, Bactrim [sulfamethoxazole-trimethoprim], Doxycycline, Sucralose, and Suprax [cefixime]    Social History     Occupational History    Not on file   Tobacco Use    Smoking status: Never    Smokeless tobacco: Never   Substance and Sexual Activity    Alcohol use: Yes     Comment: Drink on beverage 1-2 times per year.     Drug use: Never    Sexual activity: Not Currently     Birth control/protection: Condom        Current Outpatient Medications   Medication Instructions    aspirin delayed-release 81 mg, Oral, DAILY    azelaic acid (FINACEA) 15 % topical gel Topical, 2 TIMES DAILY    Bystolic 5 mg tablet MARLO 1/2 TABLET BY MOUTH ONCE DAILY    clonazePAM (KLONOPIN) 1 mg, Oral, 3 TIMES DAILY    codeine-butalbital-acetaminophen-caffeine (FIORICET WITH CODEINE) -56-30 mg capsule 1 Capsule, Oral, EVERY 8 HOURS AS NEEDED    cyclobenzaprine (FLEXERIL) 5 mg tablet TAKE ONE TABLET BY MOUTH THREE TIMES A DAY AS NEEDED FOR MUSCLE SPASMS    dicyclomine (BENTYL) 20 mg tablet TAKE ONE TABLET BY MOUTH EVERY 6 HOURS AS NEEDED FOR ABDOMINAL CRAMPS    famotidine (PEPCID AC) 10 mg, Oral, EVERY BEDTIME    naproxen sodium (ALEVE) 220 mg, Oral, DAILY    omeprazole (PRILOSEC) 20 mg capsule TAKE ONE CAPSULE BY MOUTH TWICE A DAY    ondansetron (ZOFRAN ODT) 4 mg disintegrating tablet DISSOLVE ONE TABLET BY MOUTH EVERY DAY AS NEEDED FOR NAUSEA-new directions    OTHER EVERY EVENING    SUMAtriptan (IMITREX) 100 mg tablet 1 Tablet, Oral, DAILY AS NEEDED    varicella-zoster recombinant, PF, (Shingrix, PF,) 50 mcg/0.5 mL susr injection 0.5 mL, IntraMUSCular, EVERY 6 MONTHS    venlafaxine (EFFEXOR) 75 mg, Oral, DAILY AFTER BREAKFAST, PT TAKES 225MG INTHE MORNING, 3 PILLS A DAY           Review of Systems   Constitutional:  Negative for chills and fever. Respiratory:  Negative for cough and shortness of breath. Cardiovascular:  Positive for palpitations. Negative for chest pain. Gastrointestinal:  Negative for abdominal pain, blood in stool, constipation, diarrhea, heartburn, nausea and vomiting. Genitourinary:         Irregular menses   Musculoskeletal:  Negative for joint pain and myalgias. Neurological:  Negative for tingling, focal weakness and headaches. Endo/Heme/Allergies:  Does not bruise/bleed easily. Psychiatric/Behavioral:  Negative for depression. The patient is not nervous/anxious and does not have insomnia. Objective:     Physical Exam  Constitutional:       Appearance: Normal appearance. She is not ill-appearing. HENT:      Head: Normocephalic and atraumatic. Right Ear: Tympanic membrane, ear canal and external ear normal. There is no impacted cerumen. Left Ear: Tympanic membrane, ear canal and external ear normal. There is no impacted cerumen. Nose: Nose normal. No congestion. Mouth/Throat:      Mouth: Mucous membranes are moist.      Pharynx: Oropharynx is clear. No oropharyngeal exudate. Eyes:      Conjunctiva/sclera: Conjunctivae normal.      Pupils: Pupils are equal, round, and reactive to light. Cardiovascular:      Rate and Rhythm: Normal rate and regular rhythm. Heart sounds: No murmur heard. Pulmonary:      Effort: Pulmonary effort is normal. No respiratory distress. Breath sounds: Normal breath sounds. No wheezing. Musculoskeletal:      Cervical back: Normal range of motion and neck supple. Right lower leg: No edema. Left lower leg: No edema. Lymphadenopathy:      Cervical: No cervical adenopathy. Skin:     General: Skin is warm. Neurological:      General: No focal deficit present. Mental Status: She is alert and oriented to person, place, and time. Mental status is at baseline.       Gait: Gait normal.   Psychiatric:         Mood and Affect: Mood normal.         Thought Content: Thought content normal.         Judgment: Judgment normal.          Vitals:    11/07/22 0820   BP: 110/82   Pulse: 76   Resp: 16   Temp: 97.8 °F (36.6 °C)   TempSrc: Temporal   SpO2: 99%   Weight: 141 lb 9.6 oz (64.2 kg)   Height: 5' 2.75\" (1.594 m)        Disclaimer:  Aspects of this note may have been generated using Dragon voice recognition software. Despite editing, there may be some syntax errors   We discussed the expected course, resolution and complications of the diagnosis(es) in detail. I have discussed any significant medication side effects and warnings with the patient when indicated. I advised them to contact the office if their condition worsens, changes or fails to improve as anticipated. Patient expressed understanding of the diagnosis and plan. An electronic signature was used to authenticate this note.   -- Diana Anderson MD

## 2022-11-11 ENCOUNTER — TELEPHONE (OUTPATIENT)
Dept: INTERNAL MEDICINE CLINIC | Age: 47
End: 2022-11-11

## 2022-11-11 DIAGNOSIS — R79.89 ELEVATED LFTS: Primary | ICD-10-CM

## 2022-11-11 LAB
25(OH)D3+25(OH)D2 SERPL-MCNC: 35.9 NG/ML (ref 30–100)
ALBUMIN SERPL-MCNC: 4.6 G/DL (ref 3.8–4.8)
ALBUMIN/GLOB SERPL: 1.7 {RATIO} (ref 1.2–2.2)
ALP SERPL-CCNC: 67 IU/L (ref 44–121)
ALT SERPL-CCNC: 73 IU/L (ref 0–32)
AST SERPL-CCNC: 29 IU/L (ref 0–40)
BASOPHILS # BLD AUTO: 0.1 X10E3/UL (ref 0–0.2)
BASOPHILS NFR BLD AUTO: 1 %
BILIRUB SERPL-MCNC: 0.2 MG/DL (ref 0–1.2)
BUN SERPL-MCNC: 13 MG/DL (ref 6–24)
BUN/CREAT SERPL: 17 (ref 9–23)
CALCIUM SERPL-MCNC: 9.1 MG/DL (ref 8.7–10.2)
CHLORIDE SERPL-SCNC: 101 MMOL/L (ref 96–106)
CHOLEST SERPL-MCNC: 271 MG/DL (ref 100–199)
CO2 SERPL-SCNC: 25 MMOL/L (ref 20–29)
CREAT SERPL-MCNC: 0.77 MG/DL (ref 0.57–1)
EGFR: 97 ML/MIN/1.73
EOSINOPHIL # BLD AUTO: 0.3 X10E3/UL (ref 0–0.4)
EOSINOPHIL NFR BLD AUTO: 7 %
ERYTHROCYTE [DISTWIDTH] IN BLOOD BY AUTOMATED COUNT: 13.2 % (ref 11.7–15.4)
EST. AVERAGE GLUCOSE BLD GHB EST-MCNC: 105 MG/DL
GLOBULIN SER CALC-MCNC: 2.7 G/DL (ref 1.5–4.5)
GLUCOSE SERPL-MCNC: 76 MG/DL (ref 70–99)
HBA1C MFR BLD: 5.3 % (ref 4.8–5.6)
HCT VFR BLD AUTO: 39.6 % (ref 34–46.6)
HDLC SERPL-MCNC: 68 MG/DL
HGB BLD-MCNC: 13.1 G/DL (ref 11.1–15.9)
IMM GRANULOCYTES # BLD AUTO: 0 X10E3/UL (ref 0–0.1)
IMM GRANULOCYTES NFR BLD AUTO: 0 %
LDLC SERPL CALC-MCNC: 186 MG/DL (ref 0–99)
LYMPHOCYTES # BLD AUTO: 2 X10E3/UL (ref 0.7–3.1)
LYMPHOCYTES NFR BLD AUTO: 47 %
MCH RBC QN AUTO: 28.8 PG (ref 26.6–33)
MCHC RBC AUTO-ENTMCNC: 33.1 G/DL (ref 31.5–35.7)
MCV RBC AUTO: 87 FL (ref 79–97)
MONOCYTES # BLD AUTO: 0.5 X10E3/UL (ref 0.1–0.9)
MONOCYTES NFR BLD AUTO: 11 %
NEUTROPHILS # BLD AUTO: 1.5 X10E3/UL (ref 1.4–7)
NEUTROPHILS NFR BLD AUTO: 34 %
PLATELET # BLD AUTO: 365 X10E3/UL (ref 150–450)
POTASSIUM SERPL-SCNC: 4.7 MMOL/L (ref 3.5–5.2)
PROT SERPL-MCNC: 7.3 G/DL (ref 6–8.5)
RBC # BLD AUTO: 4.55 X10E6/UL (ref 3.77–5.28)
SODIUM SERPL-SCNC: 137 MMOL/L (ref 134–144)
TRIGL SERPL-MCNC: 100 MG/DL (ref 0–149)
TSH SERPL DL<=0.005 MIU/L-ACNC: 2.85 UIU/ML (ref 0.45–4.5)
VIT B12 SERPL-MCNC: 221 PG/ML (ref 232–1245)
VLDLC SERPL CALC-MCNC: 17 MG/DL (ref 5–40)
WBC # BLD AUTO: 4.4 X10E3/UL (ref 3.4–10.8)

## 2022-11-11 NOTE — TELEPHONE ENCOUNTER
----- Message from Boyd Cherry MD sent at 11/11/2022  9:53 AM EST -----  Please make sure she has seen my comments

## 2022-12-13 DIAGNOSIS — R79.89 ELEVATED LFTS: Primary | ICD-10-CM

## 2022-12-13 LAB
ALBUMIN SERPL-MCNC: 4.6 G/DL (ref 3.8–4.8)
ALP SERPL-CCNC: 66 IU/L (ref 44–121)
ALT SERPL-CCNC: 53 IU/L (ref 0–32)
AST SERPL-CCNC: 34 IU/L (ref 0–40)
BILIRUB DIRECT SERPL-MCNC: <0.1 MG/DL (ref 0–0.4)
BILIRUB SERPL-MCNC: <0.2 MG/DL (ref 0–1.2)
PROT SERPL-MCNC: 7 G/DL (ref 6–8.5)

## 2023-01-07 ENCOUNTER — PATIENT MESSAGE (OUTPATIENT)
Dept: INTERNAL MEDICINE CLINIC | Age: 48
End: 2023-01-07

## 2023-01-07 DIAGNOSIS — N94.6 SEVERE MENSTRUAL CRAMPS: ICD-10-CM

## 2023-01-10 RX ORDER — CYCLOBENZAPRINE HCL 5 MG
TABLET ORAL
Qty: 30 TABLET | Refills: 1 | Status: SHIPPED | OUTPATIENT
Start: 2023-01-10

## 2023-01-10 NOTE — TELEPHONE ENCOUNTER
From: Minnie Jamil  To: Leon Méndez MD  Sent: 1/7/2023 8:10 AM EST  Subject: Refill Request    Dr. Georgette Biggs,    I see that all of my prescriptions previously prescribed by Dr. Dimitri Whitfield do not allow me to request a refill through the standard refill method. I have new medical insurance starting Jan 2023 and my pharmacy does not take this insurance. All medications that were being filled at [facility] should now go to the [facility] that I have listed in [place] as my pharmacy.     Please set a new script to the [facility] for: cyclobenzaprine 5 mg tablet    Joel Landis

## 2023-01-12 LAB — EF %, EXTERNAL: NORMAL

## 2023-02-23 DIAGNOSIS — R79.89 ELEVATED LFTS: Primary | ICD-10-CM

## 2023-02-26 LAB
ANA SER QL: NEGATIVE
CK SERPL-CCNC: 51 U/L (ref 32–182)
FERRITIN SERPL-MCNC: 17 NG/ML (ref 15–150)
GGT SERPL-CCNC: 45 IU/L (ref 0–60)
HBV CORE AB SERPL QL IA: NEGATIVE
HBV SURFACE AB SER QL: NON REACTIVE
HBV SURFACE AG SERPL QL IA: NEGATIVE
HCV IGG SERPL QL IA: NON REACTIVE
IRON SATN MFR SERPL: 33 % (ref 15–55)
IRON SERPL-MCNC: 134 UG/DL (ref 27–159)
MITOCHONDRIA M2 IGG SER-ACNC: <20 UNITS (ref 0–20)
TIBC SERPL-MCNC: 401 UG/DL (ref 250–450)
UIBC SERPL-MCNC: 267 UG/DL (ref 131–425)

## 2023-02-28 ENCOUNTER — VIRTUAL VISIT (OUTPATIENT)
Dept: INTERNAL MEDICINE CLINIC | Age: 48
End: 2023-02-28
Payer: COMMERCIAL

## 2023-02-28 DIAGNOSIS — R79.89 ELEVATED LFTS: Primary | ICD-10-CM

## 2023-02-28 DIAGNOSIS — F41.1 GENERALIZED ANXIETY DISORDER: ICD-10-CM

## 2023-02-28 DIAGNOSIS — I49.3 PVCS (PREMATURE VENTRICULAR CONTRACTIONS): ICD-10-CM

## 2023-02-28 PROCEDURE — 99214 OFFICE O/P EST MOD 30 MIN: CPT | Performed by: INTERNAL MEDICINE

## 2023-02-28 RX ORDER — NEBIVOLOL HYDROCHLORIDE 5 MG/1
2.5 TABLET ORAL DAILY
Qty: 45 TABLET | Refills: 3 | Status: SHIPPED | OUTPATIENT
Start: 2023-02-28

## 2023-02-28 NOTE — ASSESSMENT & PLAN NOTE
Chief Complaint   Patient presents with   • Labs Only   • Otalgia     rt side         This is a 68 y.o.female patient that presents today with the following: Follow-up, review labs, discuss acute and chronic conditions    Arthritis    Patient has arthritis in multiple joints, almost every joint is symmetric.  She is very concerned for rheumatologic or autoimmune disorder and would like work-up, labs have been ordered.    Chronic kidney disease, stage 3  Chronic and stable, discussed the importance of adequate hydration, avoiding nephrotoxic medications and excessive salt in the diet and keeping blood pressure under good control.  We will continue to monitor this.        Moderate episode of recurrent major depressive disorder (CMS-HCC)  This is a chronic condition, stable and very well controlled on current medications, closely followed by psychiatry who also manages her medications.  She is tolerating all of her meds without difficulty.      Hospital Outpatient Visit on 08/27/2019   Component Date Value   • 25-Hydroxy   Vitamin D 25 08/27/2019 41    • TSH 08/27/2019 0.690    • Cholesterol,Tot 08/27/2019 186    • Triglycerides 08/27/2019 103    • HDL 08/27/2019 57    • LDL 08/27/2019 108*   • Sodium 08/27/2019 135    • Potassium 08/27/2019 4.3    • Chloride 08/27/2019 99    • Co2 08/27/2019 29    • Anion Gap 08/27/2019 7.0    • Glucose 08/27/2019 108*   • Bun 08/27/2019 20    • Creatinine 08/27/2019 0.96    • Calcium 08/27/2019 10.4    • AST(SGOT) 08/27/2019 14    • ALT(SGPT) 08/27/2019 15    • Alkaline Phosphatase 08/27/2019 79    • Total Bilirubin 08/27/2019 0.4    • Albumin 08/27/2019 4.6    • Total Protein 08/27/2019 7.0    • Globulin 08/27/2019 2.4    • A-G Ratio 08/27/2019 1.9    • WBC 08/27/2019 5.5    • RBC 08/27/2019 4.16*   • Hemoglobin 08/27/2019 13.0    • Hematocrit 08/27/2019 40.3    • MCV 08/27/2019 96.9    • MCH 08/27/2019 31.3    • MCHC 08/27/2019 32.3*   • RDW 08/27/2019 42.6    • Platelet  She will follow up with cardiology about possibly adjusting dosage of Bystolic for PVC suppression but in the meantime continue as is Count 08/27/2019 257    • MPV 08/27/2019 10.4    • Neutrophils-Polys 08/27/2019 45.10    • Lymphocytes 08/27/2019 43.10*   • Monocytes 08/27/2019 6.40    • Eosinophils 08/27/2019 4.70    • Basophils 08/27/2019 0.50    • Immature Granulocytes 08/27/2019 0.20    • Nucleated RBC 08/27/2019 0.00    • Neutrophils (Absolute) 08/27/2019 2.47    • Lymphs (Absolute) 08/27/2019 2.36    • Monos (Absolute) 08/27/2019 0.35    • Eos (Absolute) 08/27/2019 0.26    • Baso (Absolute) 08/27/2019 0.03    • Immature Granulocytes (a* 08/27/2019 0.01    • NRBC (Absolute) 08/27/2019 0.00    • Fasting Status 08/27/2019 Fasting    • GFR If  08/27/2019 >60    • GFR If Non  Ameri* 08/27/2019 58*         clinical course has been stable    Past Medical History:   Diagnosis Date   • Anxiety    • Common bile duct (CBD) stricture     hx of in 1990s   • Degenerative joint disease     bulging discs in C-spine, L-spine   • Depression     sees Psych in [place]   • Fibromyalgia    • History of mammogram     9/11, diagnostic showed breast cyst, rec 1 year   • Hyperlipidemia    • Hypertension    • Pap test, as part of routine gynecological examination     last 2008, no abn history   • Post concussive syndrome     after fall in 1999   • PTSD (post-traumatic stress disorder)    • S/P colonoscopy     nl at age 52, due 2013   • Skin cancer     SCC       Past Surgical History:   Procedure Laterality Date   • HIP CANNULATED SCREW Right 4/17/2017    Procedure: HIP CANNULATED SCREW;  Surgeon: Dhaval Poole M.D.;  Location: SURGERY [facility];  Service:    • ESTEFANÍA BY LAPAROSCOPY     • EGD W/ENDOSCOPIC ULTRASOUND      1990s, common bile duct stricture, no stent       Family History   Problem Relation Age of Onset   • Cancer Mother         BREAST   • Hypertension Mother    • Hyperlipidemia Mother    • Hypertension Father    • Hyperlipidemia Father    • Cancer Father    • No Known Problems Brother    • Heart Disease Maternal Grandmother     • Hypertension Maternal Grandmother    • Heart Disease Maternal Grandfather    • No Known Problems Paternal Grandmother    • Heart Disease Paternal Grandfather    • Stroke Paternal Grandfather        Ampicillin and Latex    Current Outpatient Medications Ordered in Epic   Medication Sig Dispense Refill   • amphetamine-dextroamphetamine ER (ADDERALL XR) 30 MG XR capsule      • HYDROcodone-acetaminophen (NORCO) 5-325 MG Tab per tablet Take 1-2 Tabs by mouth every four hours as needed.     • atorvastatin (LIPITOR) 40 MG Tab TAKE 1 TABLET BY MOUTH ONCE DAILY 100 Tab 0   • lisinopril-hydrochlorothiazide (PRINZIDE, ZESTORETIC) 20-12.5 MG per tablet TAKE ONE TABLET BY MOUTH ONCE DAILY 100 Tab 3   • citalopram (CELEXA) 20 MG Tab Take 1 Tab by mouth every day. 90 Tab 1   • buPROPion SR (WELLBUTRIN SR) 150 MG TABLET SR 12 HR sustained-release tablet Take 1 Tab by mouth 2 times a day. 180 Tab 0   • gabapentin (NEURONTIN) 800 MG tablet TAKE 1 TABLET BY MOUTH THREE TIMES DAILY 270 Tab 1   • traZODone (DESYREL) 100 MG Tab Take 1 Tab by mouth at bedtime as needed for Sleep. 90 Tab 1   • levothyroxine (SYNTHROID) 88 MCG Tab TAKE ONE TABLET BY MOUTH ONCE DAILY 90 Tab 3   • omeprazole (PRILOSEC) 20 MG delayed-release capsule Take 20 mg by mouth 2 times a day.       No current [facility]-ordered facility-administered medications on file.        Constitutional ROS: No unexpected change in weight, No weakness, No unexplained fevers, sweats, or chills  Ear ROS: Positive per HPI  Pulmonary ROS: No chronic cough, sputum, or hemoptysis, No shortness of breath, No recent change in breathing  Cardiovascular ROS: No chest pain, No edema, No palpitations, Positive for hypertension   Gastrointestinal ROS: No abdominal pain, No nausea, vomiting, diarrhea, or constipation  Musculoskeletal/Extremities ROS: Positive per HPI  Neurologic ROS: Normal development, No seizures, No weakness  Psychiatric ROS: Positive for depression and anxiety  Endocrine ROS:  "Positive per HPI   ROS: Positive per HPI    Physical exam:  /78 (BP Location: Right arm, Patient Position: Sitting, BP Cuff Size: Adult)   Pulse 80   Temp 37.7 °C (99.9 °F) (Temporal)   Resp 16   Ht 1.626 m (5' 4\")   Wt 71.2 kg (157 lb)   SpO2 94%   Breastfeeding? No   BMI 26.95 kg/m²   General Appearance: Very pleasant older female, alert, no distress, well-nourished, well-groomed  Skin: Skin color, texture, turgor normal. No rashes or lesions.  Ears: External ears normal. Canals clear. TM's normal.  Lungs: negative findings: normal respiratory rate and rhythm, normal effort  Abdomen: Abdomen soft, non-tender. BS normal. No masses,  No organomegaly  Musculoskeletal: negative findings: no evidence of joint instability, positive for tenderness to palpation to left Achilles tendon with mild swelling, no erythema or warmth present  Neurologic: intact, CN II through XII grossly intact    Medical decision making/discussion: Patient was advised to discuss with pain management specialist new onset of left Achilles tenderness and swelling at her upcoming appointment this week.  We will get additional labs to further evaluate for possible rheumatologic or autoimmune disorder in the setting of her bilateral arthritis.  She is to continue care per psychiatry, she is to follow-up with me in 6 months, at that time we will do last Pap smear secondary to her age she has not had one in several years.  She will get Shingrix and Pneumovax today.    Elizabeth was seen today for labs only and otalgia.    Diagnoses and all orders for this visit:    Arthritis  -     RHEUMATOID ARTHRITIS FACTOR; Future  -     JUDY IGG TERRIE W/RFLX TO JUDY IGG IFA; Future  -     CCP ANTIBODY; Future  -     URIC ACID; Future  -     WESTERGREN SED RATE; Future  -     CRP QUANTITIVE (NON-CARDIAC); Future    Moderate episode of recurrent major depressive disorder (HCC)    Chronic kidney disease, stage 3 (HCC)    Need for hepatitis C screening test  - "     HEP C VIRUS ANTIBODY; Future    Immunization due  -     Shingles Vaccine (Shingrix)  -     PneumoVax PPV23 =>1yo        Return in about 6 months (around 3/3/2020) for Follow-up, Discuss Labs.        Please note that this dictation was created using voice recognition software. I have made every reasonable attempt to correct obvious errors, but I expect that there are errors of grammar and possibly content that I did not discover before finalizing the note.

## 2023-02-28 NOTE — PROGRESS NOTES
2/28/2023    ByteShield Solders 1975 is a 41y.o. year old female established patient,   here for video visit to evaluate the following chief complaint(s):  Chief Complaint   Patient presents with    Abnormal liver tests           ASSESSMENT/PLAN:  Below is the assessment and plan developed based on review of pertinent history, physical exam, labs, studies, and medications. 1. Elevated LFTs  -     US ABD LTD; Future  2. PVCs (premature ventricular contractions)  Assessment & Plan:  She will follow up with cardiology about possibly adjusting dosage of Bystolic for PVC suppression but in the meantime continue as is  Orders:  -     Bystolic 5 mg tablet; Take 0.5 Tablets by mouth daily. Doesn't tolerate generic, Normal, Disp-45 Tablet, R-3, DAWDoesn't tolerate generic  3. Generalized anxiety disorder  Assessment & Plan:   Will be meeting with psych in March     Secondary causes lab eval WNL for LFTs  She will plan to discuss with psychiatrist next month about whether klonopin vs venlafaxine may be causing mild hepatotoxicity  Can pursue US if not making progress with meds changes to check for fatty liver or other structural issues         SUBJECTIVE/OBJECTIVE:  Follow up visit to discuss recent mild but persistent LFT elevations  No symptoms attributable to the liver  Secondary causes eval reassuring    Orders Only on 02/23/2023  Antinuclear Antibodies Direct                 Date: 02/24/2023  Value: Negative    Ref range: Negative           Status: Final  Ferritin                                      Date: 02/24/2023  Value: 17          Ref range: 15 - 150 ng/mL     Status: Final  GGT                                           Date: 02/24/2023  Value: 45          Ref range: 0 - 60 IU/L        Status: Final  Hep C Virus Ab                                Date: 02/24/2023  Value: Non Reactive                     Ref range: Non Reactive       Status: Final                Comment: HCV antibody alone does not differentiate between previously  resolved infection and active infection. Equivocal and Reactive  HCV antibody results should be followed up with an HCV RNA test  to support the diagnosis of active HCV infection. TIBC                                          Date: 02/24/2023  Value: 401         Ref range: 250 - 450 ug/dL    Status: Final  UIBC                                          Date: 02/24/2023  Value: 267         Ref range: 131 - 425 ug/dL    Status: Final  Iron                                          Date: 02/24/2023  Value: 134         Ref range: 27 - 159 ug/dL     Status: Final  Iron % saturation                             Date: 02/24/2023  Value: 33          Ref range: 15 - 55 %          Status: Final  Hep B Core Ab, total                          Date: 02/24/2023  Value: Negative    Ref range: Negative           Status: Final  HEP B SURFACE AB, QUAL                        Date: 02/24/2023  Value: Non Reactive                       Status: Final                Comment:               Non Reactive: Inconsistent with immunity,                              less than 10 mIU/mL                Reactive:     Consistent with immunity,                              greater than 9.9 mIU/mL    Hep B surface Ag screen                       Date: 02/24/2023  Value: Negative    Ref range: Negative           Status: Final  Michochondrial (M2) Ab                        Date: 02/24/2023  Value: <20.0       Ref range: 0.0 - 20.0 Units   Status: Final                Comment:                                 Negative    0.0 - 20.0                                  Equivocal  20.1 - 24.9                                  Positive         >24.9  Mitochondrial (M2) Antibodies are found in 90-96% of  patients with primary biliary cirrhosis.     Creatine Kinase,Total                         Date: 02/24/2023  Value: 51          Ref range: 32 - 182 U/L       Status: Final  ------------      Saw cardiology recently due to some atypical chest pains, occurring at rest, improving with exercise. She had normal stress echo. He was suggesting CTA coronaries as additional info if she is still concerned  She thinks it may be more related to PVCs and wondering about increasing bystolic       Review of Systems   Constitutional:  Negative for chills and fever. Respiratory:  Negative for cough and shortness of breath. Cardiovascular:  Positive for palpitations. Negative for chest pain and leg swelling. Gastrointestinal:  Negative for abdominal pain. Skin:  Negative for rash. Neurological:  Negative for dizziness and headaches. Psychiatric/Behavioral:  The patient is nervous/anxious.          Constitutional: [x] Appears well-developed and well-nourished [x] No apparent distress      [] Abnormal -     Mental status: [x] Alert and awake  [x] Oriented to person/place/time [x] Able to follow commands    [] Abnormal -     Eyes:   EOM    [x]  Normal    [] Abnormal -   Sclera  [x]  Normal    [] Abnormal -          Discharge [x]  None visible   [] Abnormal -     HENT: [x] Normocephalic, atraumatic  [] Abnormal -   [x] Mouth/Throat: Mucous membranes are moist    External Ears [x] Normal  [] Abnormal -    Neck: [x] No visualized mass [] Abnormal -     Pulmonary/Chest: [x] Respiratory effort normal   [x] No visualized signs of difficulty breathing or respiratory distress        [] Abnormal -          Neurological:        [x] No Facial Asymmetry (Cranial nerve 7 motor function) (limited exam due to video visit)          [x] No gaze palsy        [] Abnormal -          Skin:        [x] No significant exanthematous lesions or discoloration noted on facial skin         [] Abnormal -            Psychiatric:       [x] Normal Affect [] Abnormal -              Patient-Reported Vitals 2/28/2023 8/19/2022 4/16/2021   Patient-Reported Weight 140 139 lb -   Patient-Reported Pulse - 74 -   Patient-Reported Temperature - 98.3 95.9   Patient-Reported SpO2 - - -   Patient-Reported Systolic  - 979 -   Patient-Reported Diastolic - 73 -   Patient-Reported LMP 2/24/23 - -          The following sections were reviewed & updated as appropriate: Problem List, Allergies, PMH, PSH, FH, and SH. Patient Active Problem List   Diagnosis Code    Factor V Leiden ([facility] [address] D68.51    Generalized anxiety disorder F41.1    Migraine G43.909    PVCs (premature ventricular contractions) I49.3    Mixed hyperlipidemia E78.2    Thrombocytosis D75.839    Cervical disc disease M50.90    Unspecified vitamin D deficiency E55.9    Allergic rhinitis J30.9    Severe menstrual cramps N94.6    Active advance directive on file Z78.9    Abdominal cramping R10.9    Biliary dyskinesia K82.8        Current Outpatient Medications   Medication Sig Dispense Refill    Bystolic 5 mg tablet Take 0.5 Tablets by mouth daily. Doesn't tolerate generic 45 Tablet 3    cyclobenzaprine (FLEXERIL) 5 mg tablet TAKE ONE TABLET BY MOUTH THREE TIMES A DAY AS NEEDED FOR MUSCLE SPASMS 30 Tablet 1    SUMAtriptan (IMITREX) 100 mg tablet Take 1 Tablet by mouth daily as needed. dicyclomine (BENTYL) 20 mg tablet TAKE ONE TABLET BY MOUTH EVERY 6 HOURS AS NEEDED FOR ABDOMINAL CRAMPS 40 Tablet 1    omeprazole (PRILOSEC) 20 mg capsule TAKE ONE CAPSULE BY MOUTH TWICE A DAY 60 Capsule 5    clonazePAM (KLONOPIN) 1 mg tablet Take 1 mg by mouth three (3) times daily. venlafaxine (EFFEXOR) 75 mg tablet Take 75 mg by mouth daily (after breakfast). PT TAKES 225MG INTHE MORNING, 3 PILLS A DAY      aspirin delayed-release 81 mg tablet Take 81 mg by mouth daily. OTHER every evening. Indications: CBD Oil      azelaic acid (FINACEA) 15 % topical gel Apply  to affected area two (2) times a day.       ondansetron (ZOFRAN ODT) 4 mg disintegrating tablet DISSOLVE ONE TABLET BY MOUTH EVERY DAY AS NEEDED FOR NAUSEA-new directions 30 Tablet 1       Amoxicillin, Bactrim [sulfamethoxazole-trimethoprim], Doxycycline, Sucralose, and Suprax [cefixime]    Social History Occupational History    Not on file   Tobacco Use    Smoking status: Never    Smokeless tobacco: Never   Substance and Sexual Activity    Alcohol use: Yes     Comment: Drink on beverage 1-2 times per year. Drug use: Never    Sexual activity: Not Currently     Birth control/protection: Condom            On this date 02/28/2023 I have spent 31 minutes reviewing previous notes, test results and face to face (virtual) with the patient discussing the diagnosis and importance of compliance with the treatment plan as well as documenting on the day of the visit. Time spent on telemed visit: 28 min    The patient was evaluated through a synchronous (real-time) audio-video encounter. The patient (or guardian if applicable) is aware that this is a billable service, which includes applicable co-pays. Verbal consent to proceed has been obtained. The visit was conducted pursuant to the emergency declaration under the [address] [place] [address], [address] [place] [address] authority and the [employer] General Act. Patient identification was verified, and a caregiver was present when appropriate. The patient was located at home in a state where the provider was licensed to provide care. Disclaimer:  Aspects of this note may have been generated using Dragon voice recognition software. Despite editing, there may be some syntax errors   We discussed the expected course, resolution and complications of the diagnosis(es) in detail. I have discussed any significant medication side effects and warnings with the patient when indicated. I advised them to contact the office if their condition worsens, changes or fails to improve as anticipated. Patient expressed understanding of the diagnosis and plan. An electronic signature was used to authenticate this note.   -- Alexandra Hugo MD

## 2023-06-05 RX ORDER — CYCLOBENZAPRINE HCL 5 MG
TABLET ORAL
Qty: 30 TABLET | Refills: 0 | Status: SHIPPED | OUTPATIENT
Start: 2023-06-05

## 2023-06-05 NOTE — TELEPHONE ENCOUNTER
Chief Complaint   Patient presents with    Medication Refill     Last Appointment with Dr. Jamia Carr 11/7/22  No future appointments.

## 2023-07-10 RX ORDER — OMEPRAZOLE 20 MG/1
20 CAPSULE, DELAYED RELEASE ORAL 2 TIMES DAILY
Qty: 180 CAPSULE | Refills: 1 | Status: SHIPPED | OUTPATIENT
Start: 2023-07-10

## 2023-07-10 NOTE — TELEPHONE ENCOUNTER
Medication Refill Request    Karen Miller is requesting a refill of the following medication(s):   Omeprazole DR 20mg cap    Please send refill to:     [address] -  [phone]  [address] [place] [address]  Phone: [phone] Fax: [phone]

## 2023-07-31 ENCOUNTER — TELEPHONE (OUTPATIENT)
Age: 48
End: 2023-07-31

## 2023-07-31 RX ORDER — CYCLOBENZAPRINE HCL 5 MG
TABLET ORAL
Qty: 90 TABLET | Refills: 0 | Status: SHIPPED | OUTPATIENT
Start: 2023-07-31

## 2023-07-31 NOTE — TELEPHONE ENCOUNTER
Patient returned Jana's call from Friday the 28th. She usually screens her calls and did not recognize the phone number, but will be sure to answer today.

## 2023-07-31 NOTE — TELEPHONE ENCOUNTER
Patient states she only uses during periods, may use as many as 9 in 3 day time period, ususally less than 20 a month.  Please advise

## 2023-08-31 RX ORDER — DICYCLOMINE HCL 20 MG
20 TABLET ORAL 4 TIMES DAILY PRN
Qty: 120 TABLET | Refills: 1 | Status: SHIPPED | OUTPATIENT
Start: 2023-08-31

## 2023-08-31 NOTE — TELEPHONE ENCOUNTER
Medication Refill Request    Rodney Isadora is requesting a refill of the following medication(s):   dicyclomine (BENTYL) 20 MG tablet                      Please send refill to:     [facility] [address] [place] [address], [facility] [address] -  [phone]  [address] [place] [address]  Phone: [phone] Fax: [phone]

## 2023-08-31 NOTE — TELEPHONE ENCOUNTER
Chief Complaint   Patient presents with    Medication Refill     Last Appointment with Dr. Lucio Carr 2/28/23    Future Appointments   Date Time Provider [address]   11/15/2023  3:30 PM Nash Shaikh MD [facility]

## 2023-09-06 ENCOUNTER — TELEPHONE (OUTPATIENT)
Age: 48
End: 2023-09-06

## 2023-09-06 RX ORDER — SUMATRIPTAN 100 MG/1
100 TABLET, FILM COATED ORAL DAILY PRN
Qty: 9 TABLET | Refills: 5 | Status: SHIPPED | OUTPATIENT
Start: 2023-09-06

## 2023-09-06 NOTE — TELEPHONE ENCOUNTER
Medication Refill Request    Qian Alexandra is requesting a refill of the following medication(s):      SUMAtriptan (IMITREX) 100 MG tablet         Please send refill to:     [address] -  [phone]  [address] [place] [address]  Phone: [phone] Fax: [phone]

## 2023-11-15 ENCOUNTER — OFFICE VISIT (OUTPATIENT)
Age: 48
End: 2023-11-15
Payer: COMMERCIAL

## 2023-11-15 VITALS
RESPIRATION RATE: 16 BRPM | OXYGEN SATURATION: 99 % | TEMPERATURE: 97.8 F | BODY MASS INDEX: 29 KG/M2 | HEART RATE: 85 BPM | HEIGHT: 62 IN | DIASTOLIC BLOOD PRESSURE: 74 MMHG | SYSTOLIC BLOOD PRESSURE: 119 MMHG | WEIGHT: 157.6 LBS

## 2023-11-15 DIAGNOSIS — G89.29 CHRONIC BILATERAL LOW BACK PAIN WITHOUT SCIATICA: ICD-10-CM

## 2023-11-15 DIAGNOSIS — Z00.00 ROUTINE PHYSICAL EXAMINATION: Primary | ICD-10-CM

## 2023-11-15 DIAGNOSIS — F41.1 GENERALIZED ANXIETY DISORDER: ICD-10-CM

## 2023-11-15 DIAGNOSIS — M54.50 CHRONIC BILATERAL LOW BACK PAIN WITHOUT SCIATICA: ICD-10-CM

## 2023-11-15 DIAGNOSIS — D68.51 FACTOR V LEIDEN (HCC): ICD-10-CM

## 2023-11-15 DIAGNOSIS — I49.3 PVCS (PREMATURE VENTRICULAR CONTRACTIONS): ICD-10-CM

## 2023-11-15 DIAGNOSIS — G43.009 MIGRAINE WITHOUT AURA AND WITHOUT STATUS MIGRAINOSUS, NOT INTRACTABLE: ICD-10-CM

## 2023-11-15 PROCEDURE — 99396 PREV VISIT EST AGE 40-64: CPT | Performed by: INTERNAL MEDICINE

## 2023-11-15 RX ORDER — ACETAMINOPHEN 160 MG
TABLET,DISINTEGRATING ORAL DAILY
COMMUNITY

## 2023-11-15 SDOH — ECONOMIC STABILITY: FOOD INSECURITY: WITHIN THE PAST 12 MONTHS, YOU WORRIED THAT YOUR FOOD WOULD RUN OUT BEFORE YOU GOT MONEY TO BUY MORE.: NEVER TRUE

## 2023-11-15 SDOH — ECONOMIC STABILITY: HOUSING INSECURITY
IN THE LAST 12 MONTHS, WAS THERE A TIME WHEN YOU DID NOT HAVE A STEADY PLACE TO SLEEP OR SLEPT IN A SHELTER (INCLUDING NOW)?: NO

## 2023-11-15 SDOH — ECONOMIC STABILITY: INCOME INSECURITY: HOW HARD IS IT FOR YOU TO PAY FOR THE VERY BASICS LIKE FOOD, HOUSING, MEDICAL CARE, AND HEATING?: NOT HARD AT ALL

## 2023-11-15 SDOH — ECONOMIC STABILITY: FOOD INSECURITY: WITHIN THE PAST 12 MONTHS, THE FOOD YOU BOUGHT JUST DIDN'T LAST AND YOU DIDN'T HAVE MONEY TO GET MORE.: NEVER TRUE

## 2023-11-15 ASSESSMENT — PATIENT HEALTH QUESTIONNAIRE - PHQ9
SUM OF ALL RESPONSES TO PHQ QUESTIONS 1-9: 0
SUM OF ALL RESPONSES TO PHQ QUESTIONS 1-9: 0
SUM OF ALL RESPONSES TO PHQ9 QUESTIONS 1 & 2: 0
SUM OF ALL RESPONSES TO PHQ QUESTIONS 1-9: 0
1. LITTLE INTEREST OR PLEASURE IN DOING THINGS: 0
2. FEELING DOWN, DEPRESSED OR HOPELESS: 0
SUM OF ALL RESPONSES TO PHQ QUESTIONS 1-9: 0

## 2023-11-15 ASSESSMENT — ENCOUNTER SYMPTOMS
ABDOMINAL PAIN: 0
NAUSEA: 0
CONSTIPATION: 0
DIARRHEA: 0
SHORTNESS OF BREATH: 0
COUGH: 0
BACK PAIN: 1
VOMITING: 0
EYES NEGATIVE: 1

## 2023-11-15 NOTE — ASSESSMENT & PLAN NOTE
Gets some chest pains with PVCs  Saw Dr Cintia Christian end of last year, had echo, stress testing, all reassuring  Continue 1/2 tab bystolic  Has s/e with generic meds

## 2023-11-15 NOTE — ASSESSMENT & PLAN NOTE
Some tension type headaches lately, she is seeing eye doc and dentist to try to rule out contributors

## 2023-11-15 NOTE — PROGRESS NOTES
fails to improve as anticipated. Patient expressed understanding of the diagnosis and plan. An electronic signature was used to authenticate this note.   -- Dawood Da Silva MD

## 2023-11-16 DIAGNOSIS — Z00.00 ROUTINE PHYSICAL EXAMINATION: ICD-10-CM

## 2023-12-29 LAB
ALBUMIN SERPL-MCNC: 4.4 G/DL (ref 3.9–4.9)
ALBUMIN/GLOB SERPL: 1.8 {RATIO} (ref 1.2–2.2)
ALP SERPL-CCNC: 117 IU/L (ref 44–121)
ALT SERPL-CCNC: 100 IU/L (ref 0–32)
AST SERPL-CCNC: 31 IU/L (ref 0–40)
BASOPHILS # BLD AUTO: 0 X10E3/UL (ref 0–0.2)
BASOPHILS NFR BLD AUTO: 1 %
BILIRUB SERPL-MCNC: <0.2 MG/DL (ref 0–1.2)
BUN SERPL-MCNC: 10 MG/DL (ref 6–24)
BUN/CREAT SERPL: 13 (ref 9–23)
CALCIUM SERPL-MCNC: 9.5 MG/DL (ref 8.7–10.2)
CHLORIDE SERPL-SCNC: 101 MMOL/L (ref 96–106)
CHOLEST SERPL-MCNC: 255 MG/DL (ref 100–199)
CO2 SERPL-SCNC: 25 MMOL/L (ref 20–29)
CREAT SERPL-MCNC: 0.78 MG/DL (ref 0.57–1)
EGFRCR SERPLBLD CKD-EPI 2021: 95 ML/MIN/1.73
EOSINOPHIL # BLD AUTO: 0.1 X10E3/UL (ref 0–0.4)
EOSINOPHIL NFR BLD AUTO: 3 %
ERYTHROCYTE [DISTWIDTH] IN BLOOD BY AUTOMATED COUNT: 14.5 % (ref 11.7–15.4)
GLOBULIN SER CALC-MCNC: 2.4 G/DL (ref 1.5–4.5)
GLUCOSE SERPL-MCNC: 87 MG/DL (ref 70–99)
HBA1C MFR BLD: 5.6 % (ref 4.8–5.6)
HCT VFR BLD AUTO: 37.6 % (ref 34–46.6)
HDLC SERPL-MCNC: 60 MG/DL
HGB BLD-MCNC: 11.7 G/DL (ref 11.1–15.9)
IMM GRANULOCYTES # BLD AUTO: 0 X10E3/UL (ref 0–0.1)
IMM GRANULOCYTES NFR BLD AUTO: 0 %
LDLC SERPL CALC-MCNC: 174 MG/DL (ref 0–99)
LYMPHOCYTES # BLD AUTO: 1.9 X10E3/UL (ref 0.7–3.1)
LYMPHOCYTES NFR BLD AUTO: 42 %
MCH RBC QN AUTO: 26.5 PG (ref 26.6–33)
MCHC RBC AUTO-ENTMCNC: 31.1 G/DL (ref 31.5–35.7)
MCV RBC AUTO: 85 FL (ref 79–97)
MONOCYTES # BLD AUTO: 0.6 X10E3/UL (ref 0.1–0.9)
MONOCYTES NFR BLD AUTO: 13 %
NEUTROPHILS # BLD AUTO: 1.9 X10E3/UL (ref 1.4–7)
NEUTROPHILS NFR BLD AUTO: 41 %
PLATELET # BLD AUTO: 574 X10E3/UL (ref 150–450)
POTASSIUM SERPL-SCNC: 5.5 MMOL/L (ref 3.5–5.2)
PROT SERPL-MCNC: 6.8 G/DL (ref 6–8.5)
RBC # BLD AUTO: 4.42 X10E6/UL (ref 3.77–5.28)
SODIUM SERPL-SCNC: 138 MMOL/L (ref 134–144)
TRIGL SERPL-MCNC: 116 MG/DL (ref 0–149)
TSH SERPL DL<=0.005 MIU/L-ACNC: 2.83 UIU/ML (ref 0.45–4.5)
VLDLC SERPL CALC-MCNC: 21 MG/DL (ref 5–40)
WBC # BLD AUTO: 4.5 X10E3/UL (ref 3.4–10.8)

## 2024-01-02 ENCOUNTER — PATIENT MESSAGE (OUTPATIENT)
Age: 49
End: 2024-01-02

## 2024-01-02 DIAGNOSIS — D75.839 THROMBOCYTOSIS: ICD-10-CM

## 2024-01-02 DIAGNOSIS — R79.89 ELEVATED LFTS: ICD-10-CM

## 2024-01-02 DIAGNOSIS — D68.51 FACTOR V LEIDEN (HCC): Primary | ICD-10-CM

## 2024-01-02 NOTE — TELEPHONE ENCOUNTER
From: Kallie Fontanez  To: Dr. Margarette Pham  Sent: 1/2/2024 7:41 AM EST  Subject: Lab Work    Hi Dr. Pham,  I know that you have not had a chance to review my results yet from my lab work. I have been sick several times which delayed me going for this lab work. I am not sure if I should have waited longer to go. I am especially concerned about my ALT going up and my Platelets being high. The platelets concern me because of my Factor V. I am not sure if 574 for my platelets is too high and I need to make an appt with Hemotologist? I can schedule a virtual visit if you would prefer to discuss on a call. Please let me know.  Kallie

## 2024-01-27 DIAGNOSIS — I49.3 VENTRICULAR PREMATURE DEPOLARIZATION: ICD-10-CM

## 2024-01-29 RX ORDER — NEBIVOLOL HYDROCHLORIDE 5 MG/1
TABLET ORAL
Qty: 45 TABLET | Refills: 3 | Status: SHIPPED | OUTPATIENT
Start: 2024-01-29

## 2024-02-02 ENCOUNTER — PATIENT MESSAGE (OUTPATIENT)
Age: 49
End: 2024-02-02

## 2024-02-02 RX ORDER — ONDANSETRON 4 MG/1
4 TABLET, ORALLY DISINTEGRATING ORAL EVERY 12 HOURS PRN
Qty: 30 TABLET | Refills: 1 | Status: SHIPPED | OUTPATIENT
Start: 2024-02-02

## 2024-02-02 NOTE — TELEPHONE ENCOUNTER
From: Kallie Fontanez  To: Dr. Margarette Pham  Sent: 2024 12:17 PM EST  Subject: Refill Request    Hi Dr. Pham,    I guess this prescription was still under my prior dr and it has . Can you please send in a script for the:  Ondansetron 4 MG disintegrating tablet to the [facility]?    Thank you,    Kallie  
Yes

## 2024-02-05 DIAGNOSIS — D75.839 THROMBOCYTOSIS: ICD-10-CM

## 2024-02-05 DIAGNOSIS — R79.89 ELEVATED LFTS: ICD-10-CM

## 2024-02-05 DIAGNOSIS — D68.51 FACTOR V LEIDEN (HCC): ICD-10-CM

## 2024-02-13 ENCOUNTER — E-VISIT (OUTPATIENT)
Age: 49
End: 2024-02-13
Payer: COMMERCIAL

## 2024-02-13 DIAGNOSIS — U07.1 COVID-19: Primary | ICD-10-CM

## 2024-02-13 PROCEDURE — 99421 OL DIG E/M SVC 5-10 MIN: CPT | Performed by: INTERNAL MEDICINE

## 2024-02-13 NOTE — PROGRESS NOTES
E-Visit Note:    HPI: per patient questionnaire, this has been reviewed  EXAM: n/a    ----------------------------------  1. COVID-19  -     nirmatrelvir/ritonavir 300/100 (PAXLOVID) 20 x 150 MG & 10 x 100MG TBPK; Take 3 tablets (two 150 mg nirmatrelvir and one 100 mg ritonavir tablets) by mouth every 12 hours for 5 days., Disp-30 tablet, R-0Normal     Advice per mychart msg  ----------------------------------  The patient was advised to call if these symptoms worsen or fail to improve as anticipated.    5-10 minutes were spent on the digital evaluation and management of this patient.     Margarette Pham MD

## 2024-03-18 ENCOUNTER — PATIENT MESSAGE (OUTPATIENT)
Age: 49
End: 2024-03-18

## 2024-03-18 RX ORDER — CYCLOBENZAPRINE HCL 5 MG
5 TABLET ORAL 3 TIMES DAILY PRN
Qty: 90 TABLET | Refills: 0 | Status: SHIPPED | OUTPATIENT
Start: 2024-03-18

## 2024-03-18 RX ORDER — DICYCLOMINE HCL 20 MG
20 TABLET ORAL 4 TIMES DAILY PRN
Qty: 120 TABLET | Refills: 1 | Status: SHIPPED | OUTPATIENT
Start: 2024-03-18

## 2024-03-18 RX ORDER — CYCLOBENZAPRINE HCL 5 MG
TABLET ORAL
Qty: 90 TABLET | Refills: 0 | OUTPATIENT
Start: 2024-03-18

## 2024-03-18 NOTE — TELEPHONE ENCOUNTER
From: Kallie Fontanez  To: Dr. Margarette Pham  Sent: 3/18/2024 7:21 AM EDT  Subject: prescription request    Dr Pham,    My prescription has  and it looks like you have never sent a script in for this medication as it is still under Dr Kramer. Can [facility] for Dicyclomine 20mg tablets. Thank you.    Kallie

## 2024-03-19 ENCOUNTER — TELEMEDICINE (OUTPATIENT)
Age: 49
End: 2024-03-19
Payer: COMMERCIAL

## 2024-03-19 DIAGNOSIS — I49.3 PVCS (PREMATURE VENTRICULAR CONTRACTIONS): ICD-10-CM

## 2024-03-19 DIAGNOSIS — R79.89 ELEVATED LFTS: ICD-10-CM

## 2024-03-19 DIAGNOSIS — G43.009 MIGRAINE WITHOUT AURA AND WITHOUT STATUS MIGRAINOSUS, NOT INTRACTABLE: Primary | ICD-10-CM

## 2024-03-19 DIAGNOSIS — F41.1 GENERALIZED ANXIETY DISORDER: ICD-10-CM

## 2024-03-19 PROBLEM — K82.8 BILIARY DYSKINESIA: Status: RESOLVED | Noted: 2019-11-20 | Resolved: 2024-03-19

## 2024-03-19 PROCEDURE — 99213 OFFICE O/P EST LOW 20 MIN: CPT | Performed by: INTERNAL MEDICINE

## 2024-03-19 ASSESSMENT — ENCOUNTER SYMPTOMS
SHORTNESS OF BREATH: 0
COUGH: 0

## 2024-03-19 NOTE — ASSESSMENT & PLAN NOTE
Saw Dr GIOVANI Mir end of last year, had echo, stress testing, all reassuring  Continue 1/2 tab bystolic  Has s/e with generic meds

## 2024-03-19 NOTE — PROGRESS NOTES
3/19/2024    Kallie Fontanez 1975 is a 48 y.o. year old female established patient,   here for video visit to evaluate the following chief complaint(s):  Chief Complaint   Patient presents with    Elevated Hepatic Enzymes           ASSESSMENT/PLAN:  Below is the assessment and plan developed based on review of pertinent history, physical exam, labs, studies, and medications.    1. Migraine without aura and without status migrainosus, not intractable  Assessment & Plan:  Had a lot of migraines with COVID and had to take sumatriptan more often than usual  Improved now  2. PVCs (premature ventricular contractions)  Assessment & Plan:  Saw Dr GIOVANI Mir end of last year, had echo, stress testing, all reassuring  Continue 1/2 tab bystolic  Has s/e with generic meds  3. Generalized anxiety disorder  Assessment & Plan:   Monitored by specialist- no acute findings meriting change in the plan  4. Elevated LFTs  -     US ABDOMEN LIMITED; Future   Lab workup has been unrevealing, thought to be med side effects but if trending up should get US liver for completeness      Return in about 8 months (around 11/19/2024) for annual physical,or sooner as needed.       SUBJECTIVE/OBJECTIVE:  Patient here for review of chronic conditions, with statuses as documented in Assessment and Plan    She had several URIs on the winter, most recently COVID in February. Finally feeling better and wondering about when to get labs for recent elevations in platelets and LFTs    Has been seeing podiatry about onychomycosis, tried topical that didn't work  Concerned about ability to take meds with liver enzymes being abnormal    She needs to keep up her sodium and potassium per cardiology, her dentist advised stopping gatorade due to the sugar so she is looking at different electrolyte supplements, advised OK to try equivalent amounts to what she was getting through drinks      Review of Systems   Constitutional:  Negative for chills and fever.

## 2024-03-25 ENCOUNTER — PATIENT MESSAGE (OUTPATIENT)
Age: 49
End: 2024-03-25

## 2024-03-28 ENCOUNTER — TELEPHONE (OUTPATIENT)
Age: 49
End: 2024-03-28

## 2024-03-28 NOTE — TELEPHONE ENCOUNTER
Patient would like to speak to Aure to see if we heard anything about her appeal for Bystolic yet.    Kallie Fontanez - [phone]

## 2024-04-02 LAB
ALBUMIN SERPL-MCNC: 4.4 G/DL (ref 3.9–4.9)
ALP SERPL-CCNC: 83 IU/L (ref 44–121)
ALT SERPL-CCNC: 42 IU/L (ref 0–32)
AST SERPL-CCNC: 17 IU/L (ref 0–40)
BASOPHILS # BLD AUTO: 0.1 X10E3/UL (ref 0–0.2)
BASOPHILS NFR BLD AUTO: 1 %
BILIRUB DIRECT SERPL-MCNC: <0.1 MG/DL (ref 0–0.4)
BILIRUB SERPL-MCNC: <0.2 MG/DL (ref 0–1.2)
EOSINOPHIL # BLD AUTO: 0.2 X10E3/UL (ref 0–0.4)
EOSINOPHIL NFR BLD AUTO: 4 %
ERYTHROCYTE [DISTWIDTH] IN BLOOD BY AUTOMATED COUNT: 14.6 % (ref 11.7–15.4)
HCT VFR BLD AUTO: 38.4 % (ref 34–46.6)
HGB BLD-MCNC: 12.2 G/DL (ref 11.1–15.9)
IMM GRANULOCYTES # BLD AUTO: 0 X10E3/UL (ref 0–0.1)
IMM GRANULOCYTES NFR BLD AUTO: 0 %
LYMPHOCYTES # BLD AUTO: 1.8 X10E3/UL (ref 0.7–3.1)
LYMPHOCYTES NFR BLD AUTO: 43 %
MCH RBC QN AUTO: 27 PG (ref 26.6–33)
MCHC RBC AUTO-ENTMCNC: 31.8 G/DL (ref 31.5–35.7)
MCV RBC AUTO: 85 FL (ref 79–97)
MONOCYTES # BLD AUTO: 0.5 X10E3/UL (ref 0.1–0.9)
MONOCYTES NFR BLD AUTO: 11 %
NEUTROPHILS # BLD AUTO: 1.8 X10E3/UL (ref 1.4–7)
NEUTROPHILS NFR BLD AUTO: 41 %
PLATELET # BLD AUTO: 390 X10E3/UL (ref 150–450)
PROT SERPL-MCNC: 7 G/DL (ref 6–8.5)
RBC # BLD AUTO: 4.52 X10E6/UL (ref 3.77–5.28)
WBC # BLD AUTO: 4.3 X10E3/UL (ref 3.4–10.8)

## 2024-06-27 NOTE — TELEPHONE ENCOUNTER
Chief Complaint   Patient presents with    Medication Refill     Last Appointment with Dr. Margarette Pham 3/19/24  No future appointments.

## 2024-06-27 NOTE — TELEPHONE ENCOUNTER
Medication Refill Request    Kallie Fontanez is requesting a refill of the following medication(s):     Omeprazole    Please send refill to:   [id number] [place] - [address] - P [phone] - F [phone] [address] [place] [address]  Phone: [phone] Fax: [phone]

## 2024-06-28 RX ORDER — CYCLOBENZAPRINE HCL 5 MG
TABLET ORAL
Qty: 90 TABLET | Refills: 0 | Status: SHIPPED | OUTPATIENT
Start: 2024-06-28

## 2024-06-28 RX ORDER — OMEPRAZOLE 20 MG/1
20 CAPSULE, DELAYED RELEASE ORAL 2 TIMES DAILY
Qty: 180 CAPSULE | Refills: 1 | Status: SHIPPED | OUTPATIENT
Start: 2024-06-28

## 2024-09-17 RX ORDER — CYCLOBENZAPRINE HCL 5 MG
5 TABLET ORAL 3 TIMES DAILY PRN
Qty: 90 TABLET | Refills: 0 | Status: SHIPPED | OUTPATIENT
Start: 2024-09-17

## 2024-11-15 ENCOUNTER — OFFICE VISIT (OUTPATIENT)
Age: 49
End: 2024-11-15
Payer: COMMERCIAL

## 2024-11-15 VITALS
DIASTOLIC BLOOD PRESSURE: 79 MMHG | WEIGHT: 163 LBS | TEMPERATURE: 97.9 F | RESPIRATION RATE: 16 BRPM | BODY MASS INDEX: 30 KG/M2 | HEART RATE: 77 BPM | SYSTOLIC BLOOD PRESSURE: 117 MMHG | HEIGHT: 62 IN | OXYGEN SATURATION: 96 %

## 2024-11-15 DIAGNOSIS — Z00.00 ROUTINE PHYSICAL EXAMINATION: Primary | ICD-10-CM

## 2024-11-15 DIAGNOSIS — E53.8 LOW SERUM VITAMIN B12: ICD-10-CM

## 2024-11-15 DIAGNOSIS — D68.51 FACTOR V LEIDEN (HCC): ICD-10-CM

## 2024-11-15 DIAGNOSIS — I49.3 PVCS (PREMATURE VENTRICULAR CONTRACTIONS): ICD-10-CM

## 2024-11-15 DIAGNOSIS — E55.9 VITAMIN D DEFICIENCY: ICD-10-CM

## 2024-11-15 DIAGNOSIS — Z00.00 ROUTINE PHYSICAL EXAMINATION: ICD-10-CM

## 2024-11-15 DIAGNOSIS — F41.1 GENERALIZED ANXIETY DISORDER: ICD-10-CM

## 2024-11-15 PROCEDURE — 99396 PREV VISIT EST AGE 40-64: CPT | Performed by: INTERNAL MEDICINE

## 2024-11-15 SDOH — ECONOMIC STABILITY: FOOD INSECURITY: WITHIN THE PAST 12 MONTHS, THE FOOD YOU BOUGHT JUST DIDN'T LAST AND YOU DIDN'T HAVE MONEY TO GET MORE.: NEVER TRUE

## 2024-11-15 SDOH — ECONOMIC STABILITY: INCOME INSECURITY: HOW HARD IS IT FOR YOU TO PAY FOR THE VERY BASICS LIKE FOOD, HOUSING, MEDICAL CARE, AND HEATING?: NOT HARD AT ALL

## 2024-11-15 SDOH — ECONOMIC STABILITY: FOOD INSECURITY: WITHIN THE PAST 12 MONTHS, YOU WORRIED THAT YOUR FOOD WOULD RUN OUT BEFORE YOU GOT MONEY TO BUY MORE.: NEVER TRUE

## 2024-11-15 ASSESSMENT — PATIENT HEALTH QUESTIONNAIRE - PHQ9
1. LITTLE INTEREST OR PLEASURE IN DOING THINGS: NOT AT ALL
SUM OF ALL RESPONSES TO PHQ QUESTIONS 1-9: 0
2. FEELING DOWN, DEPRESSED OR HOPELESS: NOT AT ALL
SUM OF ALL RESPONSES TO PHQ QUESTIONS 1-9: 0
SUM OF ALL RESPONSES TO PHQ9 QUESTIONS 1 & 2: 0
SUM OF ALL RESPONSES TO PHQ QUESTIONS 1-9: 0
SUM OF ALL RESPONSES TO PHQ QUESTIONS 1-9: 0

## 2024-11-15 ASSESSMENT — ENCOUNTER SYMPTOMS
EYES NEGATIVE: 1
VOMITING: 0
DIARRHEA: 0
CONSTIPATION: 0
NAUSEA: 0
COUGH: 0
SHORTNESS OF BREATH: 0
ABDOMINAL PAIN: 0

## 2024-11-15 NOTE — PROGRESS NOTES
Pt last mammogram requested from [facility] per MD request. Fax # [phone]. Confirmation received.   
MD

## 2024-12-02 RX ORDER — SUMATRIPTAN SUCCINATE 100 MG/1
TABLET ORAL
Qty: 9 TABLET | Refills: 3 | Status: SHIPPED | OUTPATIENT
Start: 2024-12-02

## 2024-12-31 LAB
25(OH)D3+25(OH)D2 SERPL-MCNC: 29.9 NG/ML (ref 30–100)
ALBUMIN SERPL-MCNC: 4.3 G/DL (ref 3.9–4.9)
ALP SERPL-CCNC: 63 IU/L (ref 44–121)
ALT SERPL-CCNC: 29 IU/L (ref 0–32)
AST SERPL-CCNC: 21 IU/L (ref 0–40)
BASOPHILS # BLD AUTO: 0.1 X10E3/UL (ref 0–0.2)
BASOPHILS NFR BLD AUTO: 1 %
BILIRUB SERPL-MCNC: <0.2 MG/DL (ref 0–1.2)
BUN SERPL-MCNC: 12 MG/DL (ref 6–24)
BUN/CREAT SERPL: 15 (ref 9–23)
CALCIUM SERPL-MCNC: 9.9 MG/DL (ref 8.7–10.2)
CHLORIDE SERPL-SCNC: 101 MMOL/L (ref 96–106)
CHOLEST SERPL-MCNC: 347 MG/DL (ref 100–199)
CO2 SERPL-SCNC: 27 MMOL/L (ref 20–29)
CREAT SERPL-MCNC: 0.82 MG/DL (ref 0.57–1)
EGFRCR SERPLBLD CKD-EPI 2021: 89 ML/MIN/1.73
EOSINOPHIL # BLD AUTO: 0.1 X10E3/UL (ref 0–0.4)
EOSINOPHIL NFR BLD AUTO: 3 %
ERYTHROCYTE [DISTWIDTH] IN BLOOD BY AUTOMATED COUNT: 15.2 % (ref 11.7–15.4)
GLOBULIN SER CALC-MCNC: 2.9 G/DL (ref 1.5–4.5)
GLUCOSE SERPL-MCNC: 89 MG/DL (ref 70–99)
HBA1C MFR BLD: 5.6 % (ref 4.8–5.6)
HCT VFR BLD AUTO: 39.4 % (ref 34–46.6)
HDLC SERPL-MCNC: 66 MG/DL
HGB BLD-MCNC: 12 G/DL (ref 11.1–15.9)
IMM GRANULOCYTES # BLD AUTO: 0 X10E3/UL (ref 0–0.1)
IMM GRANULOCYTES NFR BLD AUTO: 0 %
LDLC SERPL CALC-MCNC: 249 MG/DL (ref 0–99)
LYMPHOCYTES # BLD AUTO: 1.8 X10E3/UL (ref 0.7–3.1)
LYMPHOCYTES NFR BLD AUTO: 49 %
Lab: ABNORMAL
MCH RBC QN AUTO: 26.5 PG (ref 26.6–33)
MCHC RBC AUTO-ENTMCNC: 30.5 G/DL (ref 31.5–35.7)
MCV RBC AUTO: 87 FL (ref 79–97)
MONOCYTES # BLD AUTO: 0.4 X10E3/UL (ref 0.1–0.9)
MONOCYTES NFR BLD AUTO: 10 %
NEUTROPHILS # BLD AUTO: 1.4 X10E3/UL (ref 1.4–7)
NEUTROPHILS NFR BLD AUTO: 37 %
PLATELET # BLD AUTO: 411 X10E3/UL (ref 150–450)
POTASSIUM SERPL-SCNC: 5.7 MMOL/L (ref 3.5–5.2)
PROT SERPL-MCNC: 7.2 G/DL (ref 6–8.5)
RBC # BLD AUTO: 4.52 X10E6/UL (ref 3.77–5.28)
SODIUM SERPL-SCNC: 141 MMOL/L (ref 134–144)
TRIGL SERPL-MCNC: 165 MG/DL (ref 0–149)
TSH SERPL DL<=0.005 MIU/L-ACNC: 2.64 UIU/ML (ref 0.45–4.5)
VIT B12 SERPL-MCNC: >2000 PG/ML (ref 232–1245)
VLDLC SERPL CALC-MCNC: 32 MG/DL (ref 5–40)
WBC # BLD AUTO: 3.7 X10E3/UL (ref 3.4–10.8)

## 2025-01-08 DIAGNOSIS — E78.2 MIXED HYPERLIPIDEMIA: Primary | ICD-10-CM

## 2025-01-15 DIAGNOSIS — I49.3 VENTRICULAR PREMATURE DEPOLARIZATION: ICD-10-CM

## 2025-01-15 RX ORDER — CYCLOBENZAPRINE HCL 5 MG
5 TABLET ORAL 3 TIMES DAILY PRN
Qty: 90 TABLET | Refills: 0 | Status: SHIPPED | OUTPATIENT
Start: 2025-01-15

## 2025-01-16 RX ORDER — NEBIVOLOL HYDROCHLORIDE 5 MG/1
TABLET ORAL
Qty: 45 TABLET | Refills: 1 | Status: SHIPPED | OUTPATIENT
Start: 2025-01-16

## 2025-02-24 RX ORDER — ONDANSETRON 4 MG/1
4 TABLET, ORALLY DISINTEGRATING ORAL EVERY 12 HOURS PRN
Qty: 30 TABLET | Refills: 1 | Status: SHIPPED | OUTPATIENT
Start: 2025-02-24

## 2025-02-24 NOTE — TELEPHONE ENCOUNTER
Chief Complaint   Patient presents with    Medication Refill     Last Appointment with Dr. Margarette Pham:  11/15/2024   Future Appointments   Date Time Provider Department Center   11/17/2025  3:00 PM Margarette Pham MD [facility] DEP

## 2025-02-24 NOTE — TELEPHONE ENCOUNTER
Medication Refill Request    Kallie Huseyin is requesting a refill of the following medication(s):     Ondansetron    Please send refill to:        [id number] - [place] - [address] [facility] - P [phone] - F [phone] (Pharmacy) [phone] (Other Fax)

## 2025-04-18 RX ORDER — CYCLOBENZAPRINE HCL 5 MG
5 TABLET ORAL 3 TIMES DAILY PRN
Qty: 90 TABLET | Refills: 0 | Status: SHIPPED | OUTPATIENT
Start: 2025-04-18

## 2025-04-18 NOTE — TELEPHONE ENCOUNTER
Chief Complaint   Patient presents with    Medication Refill     Last Appointment with Dr. Margarette Pham:  11/15/2024   Future Appointments   Date Time Provider Department Center   11/18/2025  3:00 PM Margarette Pham MD [facility] DEP       The above orders were approved via [facility]OR[facility] per Dr. Margarette Pham.

## 2025-05-14 ENCOUNTER — TELEMEDICINE (OUTPATIENT)
Age: 50
End: 2025-05-14
Payer: COMMERCIAL

## 2025-05-14 DIAGNOSIS — F41.1 GENERALIZED ANXIETY DISORDER: Primary | ICD-10-CM

## 2025-05-14 PROCEDURE — 99213 OFFICE O/P EST LOW 20 MIN: CPT | Performed by: INTERNAL MEDICINE

## 2025-05-14 RX ORDER — CLONAZEPAM 1 MG/1
1 TABLET ORAL 3 TIMES DAILY
Qty: 90 TABLET | Refills: 2 | Status: SHIPPED | OUTPATIENT
Start: 2025-05-14 | End: 2025-08-12

## 2025-05-14 SDOH — ECONOMIC STABILITY: FOOD INSECURITY: WITHIN THE PAST 12 MONTHS, YOU WORRIED THAT YOUR FOOD WOULD RUN OUT BEFORE YOU GOT MONEY TO BUY MORE.: NEVER TRUE

## 2025-05-14 SDOH — ECONOMIC STABILITY: INCOME INSECURITY: IN THE LAST 12 MONTHS, WAS THERE A TIME WHEN YOU WERE NOT ABLE TO PAY THE MORTGAGE OR RENT ON TIME?: NO

## 2025-05-14 SDOH — ECONOMIC STABILITY: TRANSPORTATION INSECURITY
IN THE PAST 12 MONTHS, HAS LACK OF TRANSPORTATION KEPT YOU FROM MEETINGS, WORK, OR FROM GETTING THINGS NEEDED FOR DAILY LIVING?: NO

## 2025-05-14 SDOH — ECONOMIC STABILITY: FOOD INSECURITY: WITHIN THE PAST 12 MONTHS, THE FOOD YOU BOUGHT JUST DIDN'T LAST AND YOU DIDN'T HAVE MONEY TO GET MORE.: NEVER TRUE

## 2025-05-14 SDOH — ECONOMIC STABILITY: TRANSPORTATION INSECURITY
IN THE PAST 12 MONTHS, HAS THE LACK OF TRANSPORTATION KEPT YOU FROM MEDICAL APPOINTMENTS OR FROM GETTING MEDICATIONS?: NO

## 2025-05-14 ASSESSMENT — ENCOUNTER SYMPTOMS
ABDOMINAL PAIN: 0
SHORTNESS OF BREATH: 0
COUGH: 0

## 2025-05-14 NOTE — PATIENT INSTRUCTIONS
Crisis Line  If you are currently in a crisis situation,   call [phone] immediately for [facility]  For [facility] Call Center [phone]    You can always walk into the nearest ER if experiencing suicidal thoughts and a provider will evaluate you    List of Mental Health Providers:    [facility]  [phone]  --------------------------------------------------  [facility]  [phone]  --------------------------------------------------  [facility]  [phone]  --------------------------------------------------  [facility]  [phone]  --------------------------------------------------  [facility]  [phone]  ---------------------------------------------------  [facility]  [phone]  ---------------------------------------------------  [facility]  [phone]  --------------------------------------------------  [facility]   [phone]   ---------------------------------------------------  [facility]  [phone]  --------------------------------------------------  [facility]  [phone]

## 2025-05-14 NOTE — ASSESSMENT & PLAN NOTE
Prior psych has left practice so she is looking to get re-establised  Will send short term med refills to avoid withdrawal

## 2025-05-14 NOTE — PROGRESS NOTES
5/14/2025    Kallie Fontanez 1975 is a 49 y.o. year old female established patient,   here for video visit to evaluate the following chief complaint(s):  Chief Complaint   Patient presents with    Anxiety           ASSESSMENT/PLAN:  Below is the assessment and plan developed based on review of pertinent history, physical exam, labs, studies, and medications.    1. Generalized anxiety disorder  Assessment & Plan:  Prior psych has left practice so she is looking to get re-establised  Will send short term med refills to avoid withdrawal  Orders:  -     clonazePAM (KLONOPIN) 1 MG tablet; Take 1 tablet by mouth 3 times daily for 90 days. Max Daily Amount: 3 mg, Disp-90 tablet, R-2Normal         Return for annual physical,or sooner as needed.       SUBJECTIVE/OBJECTIVE:  Patient with history of anxiety and was followed by Darwin Mora but he has left his former practice  She has been taking clonazepam for anxiety for several years on stable dose 1mg TID, denies adverse effects  She is looking into establishing with a new provider but no appt as of now and will need short term med refills  PDMP reviewed 90 pills filled 3/31    Review of Systems   Constitutional:  Negative for chills and fever.   Respiratory:  Negative for cough and shortness of breath.    Cardiovascular:  Negative for chest pain, palpitations and leg swelling.   Gastrointestinal:  Negative for abdominal pain.   Skin:  Negative for rash.            Constitutional: [x] Appears well-developed and well-nourished [x] No apparent distress      [] Abnormal -     Mental status: [x] Alert and awake  [x] Oriented to person/place/time [x] Able to follow commands    [] Abnormal -     Eyes:   EOM    [x]  Normal    [] Abnormal -   Sclera  [x]  Normal    [] Abnormal -          Discharge [x]  None visible   [] Abnormal -     HENT: [x] Normocephalic, atraumatic  [] Abnormal -   [x] Mouth/Throat: Mucous membranes are moist    External Ears [x] Normal  [] Abnormal

## 2025-05-19 DIAGNOSIS — F41.1 GENERALIZED ANXIETY DISORDER: Primary | ICD-10-CM

## 2025-06-17 ENCOUNTER — RESULTS FOLLOW-UP (OUTPATIENT)
Age: 50
End: 2025-06-17

## 2025-06-17 LAB
CHOLEST SERPL-MCNC: 255 MG/DL (ref 100–199)
HDLC SERPL-MCNC: 67 MG/DL
LDLC SERPL CALC-MCNC: 178 MG/DL (ref 0–99)
TRIGL SERPL-MCNC: 64 MG/DL (ref 0–149)
VLDLC SERPL CALC-MCNC: 10 MG/DL (ref 5–40)

## 2025-06-23 RX ORDER — OMEPRAZOLE 20 MG/1
20 CAPSULE, DELAYED RELEASE ORAL 2 TIMES DAILY
Qty: 180 CAPSULE | Refills: 1 | Status: SHIPPED | OUTPATIENT
Start: 2025-06-23

## 2025-06-23 NOTE — TELEPHONE ENCOUNTER
Chief Complaint   Patient presents with    Medication Refill     Last Appointment with Dr. Margarette Pham:  5/14/2025   Future Appointments   Date Time Provider Department Center   11/18/2025  3:00 PM Margarette Pham MD [facility] DEP

## 2025-07-08 DIAGNOSIS — E78.2 MIXED HYPERLIPIDEMIA: ICD-10-CM

## 2025-07-16 DIAGNOSIS — I49.3 VENTRICULAR PREMATURE DEPOLARIZATION: ICD-10-CM

## 2025-07-16 RX ORDER — NEBIVOLOL HYDROCHLORIDE 5 MG/1
TABLET ORAL
Qty: 45 TABLET | Refills: 1 | Status: SHIPPED | OUTPATIENT
Start: 2025-07-16

## 2025-07-16 NOTE — TELEPHONE ENCOUNTER
Chief Complaint   Patient presents with    Medication Refill     Last Appointment with Dr. Margarette Pham:  5/14/2025   Future Appointments   Date Time Provider Department Center   11/18/2025  3:00 PM Margarette Pham MD [facility] DEP       The above orders were approved via VORB per Dr. Margarette Pham.

## 2025-07-22 RX ORDER — CYCLOBENZAPRINE HCL 5 MG
5 TABLET ORAL 3 TIMES DAILY PRN
Qty: 90 TABLET | Refills: 0 | Status: SHIPPED | OUTPATIENT
Start: 2025-07-22

## 2025-07-22 NOTE — TELEPHONE ENCOUNTER
Chief Complaint   Patient presents with    Medication Refill     Last Appointment with Dr. Margarette Pham:  5/14/2025   Future Appointments   Date Time Provider Department Center   11/18/2025  3:00 PM Margarette Pham MD [facility] DEP       The above orders were approved via VORB per Dr. Margarette Pham.

## 2025-08-30 DIAGNOSIS — F41.1 GENERALIZED ANXIETY DISORDER: ICD-10-CM

## 2025-09-02 RX ORDER — CLONAZEPAM 1 MG/1
1 TABLET ORAL 3 TIMES DAILY
Qty: 90 TABLET | Refills: 0 | Status: SHIPPED | OUTPATIENT
Start: 2025-09-02 | End: 2025-12-01

## (undated) DEVICE — LAPAROSCOPIC TROCAR SLEEVE/SINGLE USE: Brand: KII® OPTICAL ACCESS SYSTEM

## (undated) DEVICE — DISSECTOR RMFG CURVED 5MM --

## (undated) DEVICE — TROCAR: Brand: KII® OPTICAL ACCESS SYSTEM

## (undated) DEVICE — STERILE POLYISOPRENE POWDER-FREE SURGICAL GLOVES WITH EMOLLIENT COATING: Brand: PROTEXIS

## (undated) DEVICE — TROCAR: Brand: KII® SLEEVE

## (undated) DEVICE — DRAPE,UTILTY,TAPE,15X26, 4EA/PK: Brand: MEDLINE

## (undated) DEVICE — GARMENT,MEDLINE,DVT,INT,CALF,MED, GEN2: Brand: MEDLINE

## (undated) DEVICE — INFECTION CONTROL KIT SYS

## (undated) DEVICE — FILTER SMK EVAC FLO CLR MEGADYNE

## (undated) DEVICE — BAG SPEC REM 224ML W4XL6IN DIA10MM 1 HND GYN DISP ENDOPCH

## (undated) DEVICE — SUTURE MCRYL SZ 4-0 L27IN ABSRB UD L19MM PS-2 1/2 CIR PRIM Y426H

## (undated) DEVICE — TUBING INSUF 0.3UM FLTR W/ LUERLOCK CONN

## (undated) DEVICE — NEEDLE HYPO 22GA L1.5IN BLK S STL HUB POLYPR SHLD REG BVL

## (undated) DEVICE — INSUFFLATION NEEDLE: Brand: SURGINEEDLE

## (undated) DEVICE — STRAP,POSITIONING,KNEE/BODY,FOAM,4X60": Brand: MEDLINE

## (undated) DEVICE — DERMABOND SKIN ADH 0.7ML -- DERMABOND ADVANCED 12/BX

## (undated) DEVICE — (D)PREP SKN CHLRAPRP APPL 26ML -- CONVERT TO ITEM 371833

## (undated) DEVICE — CLICKLINE SCISSORS INSERT: Brand: CLICKLINE

## (undated) DEVICE — SURGICAL PROCEDURE KIT GEN LAPAROSCOPY LF

## (undated) DEVICE — SUTURE SZ 0 27IN 5/8 CIR UR-6  TAPER PT VIOLET ABSRB VICRYL J603H

## (undated) DEVICE — REM POLYHESIVE ADULT PATIENT RETURN ELECTRODE: Brand: VALLEYLAB

## (undated) DEVICE — ELECTRODE ES 36CM LAP FLAT L HK COAT DISP CLEANCOAT